# Patient Record
Sex: MALE | Race: OTHER | NOT HISPANIC OR LATINO | URBAN - METROPOLITAN AREA
[De-identification: names, ages, dates, MRNs, and addresses within clinical notes are randomized per-mention and may not be internally consistent; named-entity substitution may affect disease eponyms.]

---

## 2017-08-03 ENCOUNTER — EMERGENCY (EMERGENCY)
Facility: HOSPITAL | Age: 62
LOS: 1 days | Discharge: LEFT BEFORE DISCHARGE | End: 2017-08-03
Attending: EMERGENCY MEDICINE
Payer: COMMERCIAL

## 2017-08-03 VITALS
DIASTOLIC BLOOD PRESSURE: 77 MMHG | RESPIRATION RATE: 20 BRPM | HEIGHT: 67 IN | HEART RATE: 88 BPM | TEMPERATURE: 100 F | WEIGHT: 154.98 LBS | SYSTOLIC BLOOD PRESSURE: 117 MMHG | OXYGEN SATURATION: 96 %

## 2017-08-03 VITALS
TEMPERATURE: 99 F | DIASTOLIC BLOOD PRESSURE: 77 MMHG | OXYGEN SATURATION: 99 % | SYSTOLIC BLOOD PRESSURE: 134 MMHG | RESPIRATION RATE: 18 BRPM | HEART RATE: 76 BPM

## 2017-08-03 DIAGNOSIS — R10.9 UNSPECIFIED ABDOMINAL PAIN: ICD-10-CM

## 2017-08-03 DIAGNOSIS — Z88.2 ALLERGY STATUS TO SULFONAMIDES: ICD-10-CM

## 2017-08-03 DIAGNOSIS — Z85.46 PERSONAL HISTORY OF MALIGNANT NEOPLASM OF PROSTATE: ICD-10-CM

## 2017-08-03 LAB
ALBUMIN SERPL ELPH-MCNC: 3.8 G/DL — SIGNIFICANT CHANGE UP (ref 3.5–5)
ALP SERPL-CCNC: 85 U/L — SIGNIFICANT CHANGE UP (ref 40–120)
ALT FLD-CCNC: 28 U/L DA — SIGNIFICANT CHANGE UP (ref 10–60)
ANION GAP SERPL CALC-SCNC: 4 MMOL/L — LOW (ref 5–17)
APTT BLD: 30 SEC — SIGNIFICANT CHANGE UP (ref 27.5–37.4)
AST SERPL-CCNC: 19 U/L — SIGNIFICANT CHANGE UP (ref 10–40)
BASOPHILS # BLD AUTO: 0.1 K/UL — SIGNIFICANT CHANGE UP (ref 0–0.2)
BASOPHILS NFR BLD AUTO: 0.6 % — SIGNIFICANT CHANGE UP (ref 0–2)
BILIRUB SERPL-MCNC: 0.7 MG/DL — SIGNIFICANT CHANGE UP (ref 0.2–1.2)
BUN SERPL-MCNC: 13 MG/DL — SIGNIFICANT CHANGE UP (ref 7–18)
CALCIUM SERPL-MCNC: 9 MG/DL — SIGNIFICANT CHANGE UP (ref 8.4–10.5)
CHLORIDE SERPL-SCNC: 102 MMOL/L — SIGNIFICANT CHANGE UP (ref 96–108)
CO2 SERPL-SCNC: 32 MMOL/L — HIGH (ref 22–31)
CREAT SERPL-MCNC: 1.11 MG/DL — SIGNIFICANT CHANGE UP (ref 0.5–1.3)
EOSINOPHIL # BLD AUTO: 0 K/UL — SIGNIFICANT CHANGE UP (ref 0–0.5)
EOSINOPHIL NFR BLD AUTO: 0.4 % — SIGNIFICANT CHANGE UP (ref 0–6)
GLUCOSE SERPL-MCNC: 98 MG/DL — SIGNIFICANT CHANGE UP (ref 70–99)
HCT VFR BLD CALC: 45.3 % — SIGNIFICANT CHANGE UP (ref 39–50)
HGB BLD-MCNC: 14.8 G/DL — SIGNIFICANT CHANGE UP (ref 13–17)
INR BLD: 1.1 RATIO — SIGNIFICANT CHANGE UP (ref 0.88–1.16)
LYMPHOCYTES # BLD AUTO: 1.6 K/UL — SIGNIFICANT CHANGE UP (ref 1–3.3)
LYMPHOCYTES # BLD AUTO: 13.9 % — SIGNIFICANT CHANGE UP (ref 13–44)
MCHC RBC-ENTMCNC: 27.5 PG — SIGNIFICANT CHANGE UP (ref 27–34)
MCHC RBC-ENTMCNC: 32.7 GM/DL — SIGNIFICANT CHANGE UP (ref 32–36)
MCV RBC AUTO: 84.2 FL — SIGNIFICANT CHANGE UP (ref 80–100)
MONOCYTES # BLD AUTO: 1 K/UL — HIGH (ref 0–0.9)
MONOCYTES NFR BLD AUTO: 8.5 % — SIGNIFICANT CHANGE UP (ref 2–14)
NEUTROPHILS # BLD AUTO: 8.9 K/UL — HIGH (ref 1.8–7.4)
NEUTROPHILS NFR BLD AUTO: 76.7 % — SIGNIFICANT CHANGE UP (ref 43–77)
PLATELET # BLD AUTO: 183 K/UL — SIGNIFICANT CHANGE UP (ref 150–400)
POTASSIUM SERPL-MCNC: 4.3 MMOL/L — SIGNIFICANT CHANGE UP (ref 3.5–5.3)
POTASSIUM SERPL-SCNC: 4.3 MMOL/L — SIGNIFICANT CHANGE UP (ref 3.5–5.3)
PROT SERPL-MCNC: 7.7 G/DL — SIGNIFICANT CHANGE UP (ref 6–8.3)
PROTHROM AB SERPL-ACNC: 12 SEC — SIGNIFICANT CHANGE UP (ref 9.8–12.7)
RBC # BLD: 5.38 M/UL — SIGNIFICANT CHANGE UP (ref 4.2–5.8)
RBC # FLD: 12.1 % — SIGNIFICANT CHANGE UP (ref 10.3–14.5)
SODIUM SERPL-SCNC: 138 MMOL/L — SIGNIFICANT CHANGE UP (ref 135–145)
WBC # BLD: 11.6 K/UL — HIGH (ref 3.8–10.5)
WBC # FLD AUTO: 11.6 K/UL — HIGH (ref 3.8–10.5)

## 2017-08-03 PROCEDURE — 85730 THROMBOPLASTIN TIME PARTIAL: CPT

## 2017-08-03 PROCEDURE — 96374 THER/PROPH/DIAG INJ IV PUSH: CPT | Mod: XU

## 2017-08-03 PROCEDURE — 85027 COMPLETE CBC AUTOMATED: CPT

## 2017-08-03 PROCEDURE — 99284 EMERGENCY DEPT VISIT MOD MDM: CPT | Mod: 25

## 2017-08-03 PROCEDURE — 99284 EMERGENCY DEPT VISIT MOD MDM: CPT

## 2017-08-03 PROCEDURE — 80053 COMPREHEN METABOLIC PANEL: CPT

## 2017-08-03 PROCEDURE — 96375 TX/PRO/DX INJ NEW DRUG ADDON: CPT

## 2017-08-03 PROCEDURE — 74177 CT ABD & PELVIS W/CONTRAST: CPT | Mod: 26

## 2017-08-03 PROCEDURE — 85610 PROTHROMBIN TIME: CPT

## 2017-08-03 PROCEDURE — 74177 CT ABD & PELVIS W/CONTRAST: CPT

## 2017-08-03 RX ORDER — ACETAMINOPHEN 500 MG
975 TABLET ORAL ONCE
Qty: 0 | Refills: 0 | Status: COMPLETED | OUTPATIENT
Start: 2017-08-03 | End: 2017-08-03

## 2017-08-03 RX ORDER — SODIUM CHLORIDE 9 MG/ML
3 INJECTION INTRAMUSCULAR; INTRAVENOUS; SUBCUTANEOUS ONCE
Qty: 0 | Refills: 0 | Status: COMPLETED | OUTPATIENT
Start: 2017-08-03 | End: 2017-08-03

## 2017-08-03 RX ORDER — PIPERACILLIN AND TAZOBACTAM 4; .5 G/20ML; G/20ML
3.38 INJECTION, POWDER, LYOPHILIZED, FOR SOLUTION INTRAVENOUS ONCE
Qty: 0 | Refills: 0 | Status: COMPLETED | OUTPATIENT
Start: 2017-08-03 | End: 2017-08-03

## 2017-08-03 RX ORDER — METRONIDAZOLE 500 MG
1 TABLET ORAL
Qty: 40 | Refills: 0
Start: 2017-08-03 | End: 2017-08-13

## 2017-08-03 RX ORDER — CIPROFLOXACIN LACTATE 400MG/40ML
1 VIAL (ML) INTRAVENOUS
Qty: 20 | Refills: 0
Start: 2017-08-03 | End: 2017-08-13

## 2017-08-03 RX ORDER — ONDANSETRON 8 MG/1
4 TABLET, FILM COATED ORAL ONCE
Qty: 0 | Refills: 0 | Status: COMPLETED | OUTPATIENT
Start: 2017-08-03 | End: 2017-08-03

## 2017-08-03 RX ORDER — SODIUM CHLORIDE 9 MG/ML
1000 INJECTION INTRAMUSCULAR; INTRAVENOUS; SUBCUTANEOUS ONCE
Qty: 0 | Refills: 0 | Status: COMPLETED | OUTPATIENT
Start: 2017-08-03 | End: 2017-08-03

## 2017-08-03 RX ADMIN — SODIUM CHLORIDE 3 MILLILITER(S): 9 INJECTION INTRAMUSCULAR; INTRAVENOUS; SUBCUTANEOUS at 17:12

## 2017-08-03 RX ADMIN — SODIUM CHLORIDE 1000 MILLILITER(S): 9 INJECTION INTRAMUSCULAR; INTRAVENOUS; SUBCUTANEOUS at 17:45

## 2017-08-03 RX ADMIN — Medication 975 MILLIGRAM(S): at 19:22

## 2017-08-03 RX ADMIN — ONDANSETRON 4 MILLIGRAM(S): 8 TABLET, FILM COATED ORAL at 17:25

## 2017-08-03 RX ADMIN — PIPERACILLIN AND TAZOBACTAM 200 GRAM(S): 4; .5 INJECTION, POWDER, LYOPHILIZED, FOR SOLUTION INTRAVENOUS at 17:25

## 2017-08-03 NOTE — ED PROVIDER NOTE - OBJECTIVE STATEMENT
63 y/o M pt w/ no significant PMHx presents to the ED c/o abd pain, diarrhea and subjective fever x2 days. Pt states that he went to his PMD for his symptoms and was told to present to the ED. Pt states that he had a colonoscopy within the last 2 years which was normal. Pt denies nausea, vomiting, dysuria, hematuria, or any other complaints. Pt is allergic to sulfa drugs (hives).

## 2017-08-03 NOTE — ED ADULT NURSE NOTE - OBJECTIVE STATEMENT
Pt aox3, ambulatory, c/o LLQ abdominal pain, diarrhea x2 days. PT denies nausea, vomit, chills, or fever. Pt denies hematuria, denies pain in urination. Abdomen soft, LLQ tender to touch.

## 2017-08-03 NOTE — CONSULT NOTE ADULT - SUBJECTIVE AND OBJECTIVE BOX
HPI: 63 y/o male with h/o prostate ca treated with brachytherapy per pt; c/o LLQ pain for several days; persisted even after taking mag citrate for constipation; denies blood in stool; +fever, no n/v  PCP sent pt to ED for eval  CT done in ED read as Findings suggest colitis of the sigmoid colon. Questionable small foci of   pneumatosis. No portal venous gas. No free air or evidence of abscess.   Colonoscopic correlation is recommended after acute disease resolution.        PAST MEDICAL & SURGICAL HISTORY:  No pertinent past medical history  No significant past surgical history      Vital Signs Last 24 Hrs  T(C): 37.4 (03 Aug 2017 16:29), Max: 37.8 (03 Aug 2017 13:25)  T(F): 99.3 (03 Aug 2017 16:29), Max: 100 (03 Aug 2017 13:25)  HR: 76 (03 Aug 2017 16:29) (76 - 88)  BP: 134/77 (03 Aug 2017 16:29) (117/77 - 134/77)  BP(mean): --  RR: 18 (03 Aug 2017 16:29) (18 - 20)  SpO2: 99% (03 Aug 2017 16:29) (96% - 99%)                          14.8   11.6  )-----------( 183      ( 03 Aug 2017 16:01 )             45.3     08-03    138  |  102  |  13  ----------------------------<  98  4.3   |  32<H>  |  1.11    Ca    9.0      03 Aug 2017 16:01    TPro  7.7  /  Alb  3.8  /  TBili  0.7  /  DBili  x   /  AST  19  /  ALT  28  /  AlkPhos  85  08-03    PT/INR - ( 03 Aug 2017 16:01 )   PT: 12.0 sec;   INR: 1.10 ratio         PTT - ( 03 Aug 2017 16:01 )  PTT:30.0 sec    PHYSICAL EXAM  Abdomen: soft, LLQ pain, no rebound or guarding or peritoneal signs    ASSESSMENT/ PLAN:  63 y/o male with CT findings as per HPI  admission recommended for iv abx, serial abd exams  Pt refused admission; signed out AMA

## 2017-08-03 NOTE — ED PROVIDER NOTE - PROGRESS NOTE DETAILS
Patient is aaox3 and is asking to leave. The patient does not want to be admitted and understands the risk of leaving including permanent disability and DEATH. Risk, benefit, hazards, alternatives and precautions were reviewed and the pt voices understanding. He is aware that she may return at anytime and that close follow up with primary care doctor is recommended as soon as possible. He voices understanding. Will sign AMA

## 2017-08-03 NOTE — ED ADULT NURSE NOTE - ED STAT RN HANDOFF DETAILS
Endorsed to RN Joaquin, alert, responsive, resting in stretcher. Moving all extremities. Awaiting disposition.

## 2017-08-03 NOTE — ED ADULT NURSE NOTE - CHPI ED SYMPTOMS NEG
no nausea/no burning urination/no chills/no fever/no dysuria/no vomiting/no hematuria/no blood in stool

## 2019-08-14 NOTE — ED ADULT NURSE NOTE - PAIN: PRESENCE, MLM
Patients dtr Lydia Gatica called back-she is able to do 9/19/19 appt for Dr. Vale and device clinic in the afternoon after 12-please call back an schedule    [phone]   complains of pain/discomfort

## 2020-06-03 ENCOUNTER — TRANSCRIPTION ENCOUNTER (OUTPATIENT)
Age: 65
End: 2020-06-03

## 2020-06-29 NOTE — ED ADULT NURSE NOTE - ED STAT RN HANDOFF WHERE
Hide Include Location In Plan Question?: No Detail Level: Zone Include Location In Plan?: Yes Detail Level: Generalized Detail Level: Simple Detail Level: Detailed B1

## 2021-02-22 ENCOUNTER — TRANSCRIPTION ENCOUNTER (OUTPATIENT)
Age: 66
End: 2021-02-22

## 2021-12-06 ENCOUNTER — TRANSCRIPTION ENCOUNTER (OUTPATIENT)
Age: 66
End: 2021-12-06

## 2022-09-13 ENCOUNTER — NON-APPOINTMENT (OUTPATIENT)
Age: 67
End: 2022-09-13

## 2022-10-19 ENCOUNTER — NON-APPOINTMENT (OUTPATIENT)
Age: 67
End: 2022-10-19

## 2023-04-28 ENCOUNTER — NON-APPOINTMENT (OUTPATIENT)
Age: 68
End: 2023-04-28

## 2023-05-21 ENCOUNTER — EMERGENCY (EMERGENCY)
Facility: HOSPITAL | Age: 68
LOS: 1 days | Discharge: HOME | End: 2023-05-21
Attending: EMERGENCY MEDICINE
Payer: MEDICARE

## 2023-05-21 VITALS
HEIGHT: 67 IN | RESPIRATION RATE: 17 BRPM | WEIGHT: 154.98 LBS | SYSTOLIC BLOOD PRESSURE: 130 MMHG | TEMPERATURE: 98 F | DIASTOLIC BLOOD PRESSURE: 79 MMHG | OXYGEN SATURATION: 97 % | HEART RATE: 66 BPM

## 2023-05-21 PROCEDURE — 72131 CT LUMBAR SPINE W/O DYE: CPT | Mod: 26,MA

## 2023-05-21 PROCEDURE — 99284 EMERGENCY DEPT VISIT MOD MDM: CPT | Mod: 25

## 2023-05-21 PROCEDURE — 99284 EMERGENCY DEPT VISIT MOD MDM: CPT

## 2023-05-21 PROCEDURE — 72131 CT LUMBAR SPINE W/O DYE: CPT | Mod: MA

## 2023-05-21 RX ORDER — OXYCODONE AND ACETAMINOPHEN 5; 325 MG/1; MG/1
1 TABLET ORAL ONCE
Refills: 0 | Status: DISCONTINUED | OUTPATIENT
Start: 2023-05-21 | End: 2023-05-21

## 2023-05-21 NOTE — ED ADULT NURSE NOTE - OBJECTIVE STATEMENT
Patient c/o worsening lower back pain for 3 weeks, right lower leg pain for 2 weeks, denies any injury. Patient has recent imaging done per report.

## 2023-05-21 NOTE — ED PROVIDER NOTE - CLINICAL SUMMARY MEDICAL DECISION MAKING FREE TEXT BOX
Advised patient to stick with his doctors. Gave pain medication; patient refused percocet. Will get CT lumbar spine.

## 2023-05-21 NOTE — ED PROVIDER NOTE - NSFOLLOWUPINSTRUCTIONS_ED_ALL_ED_FT
English    Acute Back Pain, Adult  Acute back pain is sudden and usually short-lived. It is often caused by an injury to the muscles and tissues in the back. The injury may result from:  A muscle, tendon, or ligament getting overstretched or torn. Ligaments are tissues that connect bones to each other. Lifting something improperly can cause a back strain.  Wear and tear (degeneration) of the spinal disks. Spinal disks are circular tissue that provide cushioning between the bones of the spine (vertebrae).  Twisting motions, such as while playing sports or doing yard work.  A hit to the back.  Arthritis.  You may have a physical exam, lab tests, and imaging tests to find the cause of your pain. Acute back pain usually goes away with rest and home care.    Follow these instructions at home:  Managing pain, stiffness, and swelling    Take over-the-counter and prescription medicines only as told by your health care provider. Treatment may include medicines for pain and inflammation that are taken by mouth or applied to the skin, or muscle relaxants.  Your health care provider may recommend applying ice during the first 24–48 hours after your pain starts. To do this:  Put ice in a plastic bag.  Place a towel between your skin and the bag.  Leave the ice on for 20 minutes, 2–3 times a day.  Remove the ice if your skin turns bright red. This is very important. If you cannot feel pain, heat, or cold, you have a greater risk of damage to the area.  If directed, apply heat to the affected area as often as told by your health care provider. Use the heat source that your health care provider recommends, such as a moist heat pack or a heating pad.  Place a towel between your skin and the heat source.  Leave the heat on for 20–30 minutes.  Remove the heat if your skin turns bright red. This is especially important if you are unable to feel pain, heat, or cold. You have a greater risk of getting burned.  Activity    Comparisons of good and bad posture while driving, standing, sitting at a desk, and lifting heavy objects.  Do not stay in bed. Staying in bed for more than 1–2 days can delay your recovery.  Sit up and stand up straight. Avoid leaning forward when you sit or hunching over when you stand.  If you work at a desk, sit close to it so you do not need to lean over. Keep your chin tucked in. Keep your neck drawn back, and keep your elbows bent at a 90-degree angle (right angle).  Sit high and close to the steering wheel when you drive. Add lower back (lumbar) support to your car seat, if needed.  Take short walks on even surfaces as soon as you are able. Try to increase the length of time you walk each day.  Do not sit, drive, or  one place for more than 30 minutes at a time. Sitting or standing for long periods of time can put stress on your back.  Do not drive or use heavy machinery while taking prescription pain medicine.  Use proper lifting techniques. When you bend and lift, use positions that put less stress on your back:  Bend your knees.  Keep the load close to your body.  Avoid twisting.  Exercise regularly as told by your health care provider. Exercising helps your back heal faster and helps prevent back injuries by keeping muscles strong and flexible.  Work with a physical therapist to make a safe exercise program, as recommended by your health care provider. Do any exercises as told by your physical therapist.  Lifestyle    Maintain a healthy weight. Extra weight puts stress on your back and makes it difficult to have good posture.  Avoid activities or situations that make you feel anxious or stressed. Stress and anxiety increase muscle tension and can make back pain worse. Learn ways to manage anxiety and stress, such as through exercise.  General instructions    Sleep on a firm mattress in a comfortable position. Try lying on your side with your knees slightly bent. If you lie on your back, put a pillow under your knees.  Keep your head and neck in a straight line with your spine (neutral position) when using electronic equipment like smartphones or pads. To do this:  Raise your smartphone or pad to look at it instead of bending your head or neck to look down.  Put the smartphone or pad at the level of your face while looking at the screen.  Follow your treatment plan as told by your health care provider. This may include:  Cognitive or behavioral therapy.  Acupuncture or massage therapy.  Meditation or yoga.  Contact a health care provider if:  You have pain that is not relieved with rest or medicine.  You have increasing pain going down into your legs or buttocks.  Your pain does not improve after 2 weeks.  You have pain at night.  You lose weight without trying.  You have a fever or chills.  You develop nausea or vomiting.  You develop abdominal pain.  Get help right away if:  You develop new bowel or bladder control problems.  You have unusual weakness or numbness in your arms or legs.  You feel faint.  These symptoms may represent a serious problem that is an emergency. Do not wait to see if the symptoms will go away. Get medical help right away. Call your local emergency services (911 in the [place] Do not drive yourself to the hospital.    Summary  Acute back pain is sudden and usually short-lived.  Use proper lifting techniques. When you bend and lift, use positions that put less stress on your back.  Take over-the-counter and prescription medicines only as told by your health care provider, and apply heat or ice as told.  This information is not intended to replace advice given to you by your health care provider. Make sure you discuss any questions you have with your health care provider.    Document Revised: 03/11/2022 Document Reviewed: 03/11/2022  Elsevier Patient Education © 2023 [employer]

## 2023-05-21 NOTE — ED ADULT NURSE NOTE - NSFALLUNIVINTERV_ED_ALL_ED
Bed/Stretcher in lowest position, wheels locked, appropriate side rails in place/Call bell, personal items and telephone in reach/Instruct patient to call for assistance before getting out of bed/chair/stretcher/Non-slip footwear applied when patient is off stretcher/[place] to call system/Physically safe environment - no spills, clutter or unnecessary equipment/Purposeful proactive rounding/Room/bathroom lighting operational, light cord in reach

## 2023-05-21 NOTE — ED PROVIDER NOTE - PATIENT PORTAL LINK FT
You can access the FollowMyHealth Patient Portal offered by [facility] by registering at the following website: [email or web address]. By joining [facility]’s FollowMyHealth portal, you will also be able to view your health information using other applications (apps) compatible with our system.

## 2023-05-21 NOTE — ED PROVIDER NOTE - NS_ATTENDINGSCRIBE_ED_ALL_ED
Labile I personally performed the service described in the documentation recorded by the scribe in my presence, and it accurately and completely records my words and actions.

## 2023-05-21 NOTE — ED PROVIDER NOTE - OBJECTIVE STATEMENT
68 year old male with arthritis presents to ED complaining of lower back pain for 3 weeks and leg pain. In particular he complains of right lower leg pain radiating downwards for 2 weeks, stating that the pain is shooting in nature and severe (8/10) especially at night. Patient reports seeing multiple doctors including a podiatrist who recommended vascular testing. Per patient orthopedist at [facility] also did X-Rays which showed knee arthritis and ?hip arthritis. Patient also states he was diagnosed with cervical pinched nerve c5-c6 level. Endorses currently on ibuprofen.  Allergies: sulfa drugs (hives)

## 2023-05-21 NOTE — ED PROVIDER NOTE - MUSCULOSKELETAL, MLM
Walks with stiff gait. Mild tenderness over lower back. Knee mildly tender diffusely, no effusion. Right leg: no swelling noted, no signs of DVT. Right foot: posterior tibia and distal pulses present, no discoloration, no ecchymosis.

## 2023-05-21 NOTE — ED ADULT TRIAGE NOTE - PATIENT ON (OXYGEN DELIVERY METHOD)
Pharmacy faxed in a request for prior authorization on:    Medication: Vyvanse   Dosage: 20 mg  Quantity requested:  30  Pharmacy for prescription has been selected.    Initiation of prior authorization needed.   room air

## 2023-05-30 ENCOUNTER — APPOINTMENT (OUTPATIENT)
Dept: ORTHOPEDIC SURGERY | Facility: CLINIC | Age: 68
End: 2023-05-30
Payer: MEDICARE

## 2023-05-30 DIAGNOSIS — M19.042 PRIMARY OSTEOARTHRITIS, LEFT HAND: ICD-10-CM

## 2023-05-30 PROCEDURE — 99213 OFFICE O/P EST LOW 20 MIN: CPT

## 2023-05-30 PROCEDURE — 73140 X-RAY EXAM OF FINGER(S): CPT | Mod: LT

## 2023-05-30 NOTE — ASSESSMENT
[FreeTextEntry1] : The condition was explained to the patient.\par \par Discussed that arthritis is a progressive degenerative process, and symptoms may have a waxing/waning course, which may be exacerbated by activity or trauma. Discussed treatment options - activity modification, bracing, steroid injection, or last resort surgery.\par Discussed increased propensity for stiffness due to underlying arthritis.\par \par Discussed my concern that hand stiffness can cause grave dysfunction and is difficult to overcome once it has set in. \par Encouraged HEP for digital ROM.\par \par - recommend silicone sleeve for finger.\par - recommend activity modification, moist heat vs ice PRN.\par - recommend Voltaren gel PRN. reviewed contra-indicated medical conditions (eg liver disease, kidney disease, or GI ulcer/bleeding) or medications (eg blood thinners).\par \par F/u PRN.

## 2023-05-30 NOTE — HISTORY OF PRESENT ILLNESS
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 Reports that he has had intermittent pain for years, but it has never been this swollen.\par Denies injury.\par Using coban wrap.\par Using Tylenol, Advil PRN for his knee.\par \par Hx: eosinophilic esophagitis. [] : no [FreeTextEntry5] : QASIM 68 year old M here for LT index finger, onset pain for about 2 weeks, pt noticed the site became sensitive and swelled up , pt believed this may have stemmed from an old injury \par pt took two extra strength Tylenol and 3 Advil, mainly for his knee  [FreeTextEntry6] : sensitive  [FreeTextEntry9] : bandage wrap [de-identified] : touch

## 2023-05-30 NOTE — IMAGING
[de-identified] : LEFT HAND\par skin intact. mild to moderate swelling of IF DIPJ.\par TTP to IF DIPJ.\par IF: DIPJ mild ext lag, otherwise good ext. flex 5cm to DPC.\par good flex/ext other digits. \par SILT to median, ulnar, radial distribution. \par brisk cap refill all digits.\par no triggering.\par \par \par XRAYS OF LEFT INDEX FINGER: no acute displaced fracture or dislocation. severe djd of DIPJ.

## 2023-06-03 ENCOUNTER — EMERGENCY (EMERGENCY)
Facility: HOSPITAL | Age: 68
LOS: 1 days | Discharge: HOME | End: 2023-06-03
Attending: EMERGENCY MEDICINE
Payer: MEDICARE

## 2023-06-03 VITALS
WEIGHT: 160.06 LBS | HEART RATE: 65 BPM | DIASTOLIC BLOOD PRESSURE: 80 MMHG | TEMPERATURE: 99 F | RESPIRATION RATE: 18 BRPM | HEIGHT: 67 IN | SYSTOLIC BLOOD PRESSURE: 150 MMHG

## 2023-06-03 PROCEDURE — 99284 EMERGENCY DEPT VISIT MOD MDM: CPT

## 2023-06-03 PROCEDURE — 73562 X-RAY EXAM OF KNEE 3: CPT | Mod: 26,RT

## 2023-06-03 PROCEDURE — 73562 X-RAY EXAM OF KNEE 3: CPT

## 2023-06-03 PROCEDURE — 96372 THER/PROPH/DIAG INJ SC/IM: CPT

## 2023-06-03 PROCEDURE — 99284 EMERGENCY DEPT VISIT MOD MDM: CPT | Mod: 25

## 2023-06-03 RX ORDER — DIAZEPAM 5 MG
5 TABLET ORAL ONCE
Refills: 0 | Status: DISCONTINUED | OUTPATIENT
Start: 2023-06-03 | End: 2023-06-03

## 2023-06-03 RX ORDER — MORPHINE SULFATE 50 MG/1
4 CAPSULE, EXTENDED RELEASE ORAL ONCE
Refills: 0 | Status: DISCONTINUED | OUTPATIENT
Start: 2023-06-03 | End: 2023-06-03

## 2023-06-03 RX ORDER — DIAZEPAM 5 MG
1 TABLET ORAL
Qty: 12 | Refills: 0
Start: 2023-06-03 | End: 2023-06-06

## 2023-06-03 RX ORDER — IBUPROFEN 200 MG
1 TABLET ORAL
Qty: 15 | Refills: 0
Start: 2023-06-03 | End: 2023-06-07

## 2023-06-03 RX ORDER — DIAZEPAM 5 MG
1 TABLET ORAL
Qty: 8 | Refills: 0
Start: 2023-06-03 | End: 2023-06-06

## 2023-06-03 RX ADMIN — Medication 5 MILLIGRAM(S): at 15:27

## 2023-06-03 RX ADMIN — MORPHINE SULFATE 4 MILLIGRAM(S): 50 CAPSULE, EXTENDED RELEASE ORAL at 15:12

## 2023-06-03 NOTE — ED PROVIDER NOTE - CLINICAL SUMMARY MEDICAL DECISION MAKING FREE TEXT BOX
68 year old male with no significant past medical history presents to the ED with complaints of pain and swelling of the right foot and posterior right knee. Will obtain x-ray, and provide Morphine and Valium and reassess.

## 2023-06-03 NOTE — ED ADULT NURSE NOTE - OBJECTIVE STATEMENT
pt is a 67 y/o  male  with  c/o pt is a 67 y/o male c/o pain and swelling on the right foot and right knee that started 1 month ago.

## 2023-06-03 NOTE — ED ADULT NURSE NOTE - NSFALLUNIVINTERV_ED_ALL_ED
Bed/Stretcher in lowest position, wheels locked, appropriate side rails in place/Call bell, personal items and telephone in reach/Instruct patient to call for assistance before getting out of bed/chair/stretcher/Non-slip footwear applied when patient is off stretcher/[place] to call system/Physically safe environment - no spills, clutter or unnecessary equipment/Purposeful proactive rounding/Room/bathroom lighting operational, light cord in reach

## 2023-06-03 NOTE — ED PROVIDER NOTE - PHYSICAL EXAMINATION
Normal distal pedal and femoral pulses. No effusion. Diffuse tenderness in popliteal area. Unable to sit still secondary to pain.

## 2023-06-03 NOTE — ED ADULT NURSE NOTE - NS ED NURSE LEVEL OF CONSCIOUSNESS MENTAL STATUS
Physical Therapy  Visit Type: initial evaluation  Precautions:  Medical precautions: ; standard precautions.   Lines:       Lines in chart and on patient reviewed, precautions maintained throughout session.    SUBJECTIVE  Patient agreed to participate in therapy this date.  Pt agreeable to PT treatment.  Patient / Family Goal: maximize function and return home     OBJECTIVE   Level of consciousness: alert    Oriented to person, place, time and situation     Arousal alertness: appropriate responses to stimuli    Affect/Behavior: alert, pleasant and cooperative  Patient activity tolerance: 1 to 1 activity to rest    Bed Mobility:    Rolling left: modified independent    Rolling right: modified independent      Supine to sit: modified independent    Sit to supine: modified independent  Training completed:    Tasks: all aspects of bed mobility    Education details: body mechanics, patient safety and patient demonstrates understanding  Transfers:    Assistive devices: 4-wheeled walker    Sit to stand: modified independent    Stand to sit: modified independent    Stand pivot: modified independent  Training completed:    Tasks: sit to stand, stand to sit and stand pivot    Education details: body mechanics, patient safety and patient demonstrates understanding  Gait/Ambulation:     Assistance: modified independent   Assistive device: 4-wheeled walker    Distance (ft): 50; 50    Type: decreased jaydon  Training Completed:    Tasks: gait training on level surfaces, door management and assistive device use    Education details: body mechanics, patient safety and patient demonstrates understanding      Interventions     Training provided: activity tolerance, balance retraining, bed mobility training, behavioral modification, body mechanics, caregiver training, functional ambulation, energy conservation, compensatory techniques, gait training, positioning, transfer training and safety training    Skilled input: Verbal  instruction/cues, tactile instruction/cues, posture correction and facilitation  Verbal Consent: Writer verbally educated and received verbal consent for hand placement, positioning of patient, and techniques to be performed today from patient for clothing adjustments for techniques, hand placement and palpation for techniques and therapist position for techniques as described above and how they are pertinent to the patient's plan of care.       ASSESSMENT   Impairments: activity tolerance and endurance    Patient seen in room 611/01 at [facility]TH Floor Inpatient Unit [facility] and presents at baseline which was the Modified Independent level for overall mobility with 4ww . Currently lives Alone in an  (independent living) .     For safe return to this environment, the patient needs to be Modified Independent level for overall mobility with 4ww. Pt came to the ED due to Rectal Bleeding and was subsequently admitted for Acute lower GI bleeding.    Physical Therapy evaluation/discharge session today focused on bed mobility, transfers/ambulation with the 4ww, and coordination of care with RN staff. Pt moving at the independent level for overall mobility with the 4ww. Recommended ind with 4ww for mobility on the unit - updated white board in room and notified RN. Anticipate pt will be able to return to their home environment when medically cleared. Overall pt feels that they are presenting at their baseline level of function and does not express any concerns for returning home once they are medically stable. PT to sign off at this time. Please re-order PT for re-evaluation if pt presents with a change in functional status during continued admission or undergoes any invasive procedures/surgeries. Otherwise, anticipate safe d/c home. Pt has close family support. Pt is agreeable to this plan. DC PT 9/16/2022.             Discharge Recommendations  Recommendation for Discharge Location: PT WI: Home with Home therapy              PT/OT Mobility Equipment for Discharge: has 4ww      PT Identified Barriers to Discharge: weak     Progress: improving as expected     • Skilled therapy is not required due to DC PT 9/16/2022.     • Predicted patient presentation: Low (stable) - Patient comorbidities and complexities, as defined above, will have little effect on progress for prescribed plan of care.    Education Provided:   Learning Assessment:  - Primary learner: patient  - Are they ready to learn: yes  - Preferred learning style: verbal and demonstration  - Barriers to learning: no barriers apparent at this time  Education provided during session:  - Receiving Education: patient  - Results of above outlined education: Needs reinforcement    Patient at End of Session:   Location: in bed  Safety measures: call light within reach and bed rails x2  Handoff to: nurse    PLAN   Suggestions for next session as indicated: DC PT 9/16/2022          Frequency Comments: DC PT 9/15/22  Interventions: balance, body mechanics, compensatory technique education, energy conservation, neuromuscular re-education, ROM, strengthening, bed mobility, HEP train/position, safety education, patient/family training, community reintegration, endurance training, functional transfer training, modalities and gait training  Agreement to plan and goals: patient agrees with goals and treatment plan        GOALS  Review Date: 9/30/2022  Long Term Goals: (to be met by time of discharge from hospital)  Sit to supine: Patient will complete sit to supine modified independent.  Status: met   Supine to sit: Patient will complete supine to sit modified independent.  Status: met   Sit to stand: Patient will complete sit to stand transfer with 4-wheeled walker, modified independent.   Status: met   Stand to sit: Patient will complete stand to sit transfer with 4-wheeled walker, modified independent.   Status: met   Stand pivot: Patient will complete stand pivot transfer with  4-wheeled walker, modified independent.   Status: met   Ambulation (even): Patient will ambulate on even surface for 150 feet with 4-wheeled walker, modified independent.   Status: met     Documented in the chart in the following areas: Prior Level of Function. Assessment. Plan. Patient Education.      Therapy procedure time and total treatment time can be found documented on the Time Entry flowsheet   Awake/Alert/Cooperative

## 2023-06-03 NOTE — ED PROVIDER NOTE - PATIENT PORTAL LINK FT
You can access the FollowMyHealth Patient Portal offered by [facility] by registering at the following website: [email or web address]. By joining [facility]’s FollowMyHealth portal, you will also be able to view your health information using other applications (apps) compatible with our system.

## 2023-06-03 NOTE — ED ADULT TRIAGE NOTE - RESPIRATORY RATE (BREATHS/MIN)
18 Opioid Pregnancy And Lactation Text: These medications can lead to premature delivery and should be avoided during pregnancy. These medications are also present in breast milk in small amounts.

## 2023-06-03 NOTE — ED PROVIDER NOTE - OBJECTIVE STATEMENT
68 year old male with no significant past medical history presents to the ED with complaints of pain and swelling of the right foot and posterior right knee. The patient states that he has received foot x-rays and was seen by podiatry, and a knee injection of the site by Orthopedic and received vascular studies of the knee which were negative. The patient endorses that the knee pain prevents him from flexing the knee. The patient denies any other symptoms.   Allergies: Sulfa drugs- hives

## 2023-06-22 ENCOUNTER — APPOINTMENT (OUTPATIENT)
Dept: ORTHOPEDIC SURGERY | Facility: CLINIC | Age: 68
End: 2023-06-22
Payer: MEDICARE

## 2023-06-22 PROBLEM — G58.9 MONONEUROPATHY, UNSPECIFIED: Chronic | Status: ACTIVE | Noted: 2023-05-21

## 2023-06-22 PROCEDURE — 99214 OFFICE O/P EST MOD 30 MIN: CPT

## 2023-06-22 PROCEDURE — 72050 X-RAY EXAM NECK SPINE 4/5VWS: CPT

## 2023-06-22 RX ORDER — GABAPENTIN 100 MG/1
100 CAPSULE ORAL
Qty: 60 | Refills: 1 | Status: ACTIVE | COMMUNITY
Start: 2023-06-22 | End: 1900-01-01

## 2023-06-22 NOTE — IMAGING
[Facet arthropathy] : Facet arthropathy [Disc space narrowing] : Disc space narrowing [de-identified] : CSPINE\par Inspection: No rash or ecchymosis\par Palpation: spasm and TTP in traps, rhomboids, paracervicals\par ROM: Limited all planes\par Strength: 5/5 bilateral deltoid, biceps, triceps, wrist flexors, wrist extensors, , abductors\par Sensation: Sensation present to light touch bilateral C5-T1 distributions\par Reflexes: Negative Wills's bilaterally\par Able to tandem gait

## 2023-06-22 NOTE — HISTORY OF PRESENT ILLNESS
[7] : 7 [Bending forward] : bending forward [Extending back] : extending back [Lying in bed] : lying in bed [] : yes [de-identified] : 5/5/23- EMG BUE- R C4/5, L C5/6 radic\par \par 6/22/23- RHDM. 2 months atraumatic neck pain radiating in to L>R posterior shoulder. Has been in PT for 8 weeks. Treated by Neurologist with IBU and EMG. No bb dysfunction.  [de-identified] : 05/05/2023 [de-identified] :  [de-identified] : 06/21/2023

## 2023-06-22 NOTE — ASSESSMENT
[FreeTextEntry1] : MRI to eval burden of stenosis\par C/w PT\par Trial christine\par Gabapentin- Patient advised of sedating effects, instructed not to drive, operate machinery, or take with other sedating medications. Advised of need to taper on/off medication and risk of abruptly stopping gabapentin.

## 2023-06-27 ENCOUNTER — FORM ENCOUNTER (OUTPATIENT)
Age: 68
End: 2023-06-27

## 2023-06-28 ENCOUNTER — APPOINTMENT (OUTPATIENT)
Dept: MRI IMAGING | Facility: CLINIC | Age: 68
End: 2023-06-28
Payer: MEDICARE

## 2023-06-28 PROCEDURE — 72141 MRI NECK SPINE W/O DYE: CPT

## 2023-07-20 ENCOUNTER — APPOINTMENT (OUTPATIENT)
Dept: ORTHOPEDIC SURGERY | Facility: CLINIC | Age: 68
End: 2023-07-20
Payer: MEDICARE

## 2023-07-20 PROCEDURE — 99215 OFFICE O/P EST HI 40 MIN: CPT

## 2023-07-27 NOTE — ASSESSMENT
[FreeTextEntry1] : Stenosis most notable C4-C7. Does have some L NF narrowing C3/4, C7/T1. Expect the majority of his symptoms would be addressed by C4-C7. Discussed that he does have the above imaging findings and that the majority of his symptoms should be addressed by C4-C7. \par \par Has failed a trial of conservative measures including PT, medications. Is interested in more definitive interventions. \par \par ACDF C4-C7 with bone stim\par Anterior Cervical Discectomy and Fusion- We've discussed the surgery details including instrumentation and grafting options (local, allograft, ICBG, and biologics) as well as potential for complications including but not limited to pain, scar, bleeding, and infection. There is also a possibility for hardware complication such as malposition of hardware, hardware loosening, pullout, failure or fracture of bone, adjacent segment disease, pseudarthrosis, and need for future surgery. Finally, we discussed potential for injury to nerves, the spinal cord either transient or permanent, CSF leak, damage to blood vessels, paralysis, blindness, stroke, dysphagia, dysphonia, need for transfusion, and medical complications.  The patient verbalized understanding and all questions were answered. \par \par Gabapentin- Patient advised of sedating effects, instructed not to drive, operate machinery, or take with other sedating medications. Advised of need to taper on/off medication and risk of abruptly stopping gabapentin.

## 2023-07-27 NOTE — IMAGING
[Facet arthropathy] : Facet arthropathy [Disc space narrowing] : Disc space narrowing [de-identified] : CSPINE\par Inspection: No rash or ecchymosis\par Palpation: spasm and TTP in traps, rhomboids, paracervicals\par ROM: Limited all planes\par Strength: 5/5 bilateral deltoid, biceps, triceps, wrist flexors, wrist extensors, , abductors\par Sensation: Sensation present to light touch bilateral C5-T1 distributions\par Reflexes: Negative Wills's bilaterally\par Able to tandem gait\par \par Bilateral Shoulders-\par Palpation: No tenderness to palpation\par ROM: Full with no pain\par Strength: Decent strength with rotator cuff testing\par Provocative maneuvers: Negative impingement testing

## 2023-07-27 NOTE — HISTORY OF PRESENT ILLNESS
[7] : 7 [Bending forward] : bending forward [Extending back] : extending back [Lying in bed] : lying in bed [] : yes [de-identified] : 5/5/23- EMG BUE- R C4/5, L C5/6 radic\par \par 6/28/23 Cervical MRI  - report noted in chart. \par Findings: Convex left curvature. Straightening of lordosis. Diffuse loss of disc signal and height. Diffuse \par anterior spurring and bulging. No compression fracture. Modic type 1 endplate changes at C3-C4 and C6-C7.\par C2-C3: No herniation or central stenosis. Luschka hypertrophy and facet arthrosis with left foraminal stenosis.\par C3-C4: Bulge and spondylotic ridge with central herniation and no central stenosis. Luschka hypertrophy and \par facet arthrosis with left foraminal stenosis.\par C4-C5: Bulge, spondylotic ridge and broad herniation impressing on the thecal sac with no contact of the cord \par or central herniation. Luschka hypertrophy and facet arthrosis with foraminal stenosis left greater than right.\par C5-C6: Bulge and spondylotic ridge impressing on the thecal sac with no herniation or central stenosis. Luschka \par hypertrophy and facet arthrosis with foraminal stenosis.\par C6-C7: Bulge with central and asymmetric to right herniation impressing on the thecal sac with central and \par right-sided canal stenosis. Luschka hypertrophy and facet arthrosis with foraminal stenosis right greater than \par left.\par C7-T1: Bulge with no herniation or central stenosis. Luschka hypertrophy and facet arthrosis with left foraminal \par stenosis.\par Ind. review- \par Stenosis most notable C4-C7\par =============================\par PMH: h/o prostate CA s/p brachytherapy\par PSH: knee scopes\par SH: no tob. occ. etoh, no drugs\par Occ: cameraman\par \par 6/22/23- RHDM. 2 months atraumatic neck pain radiating in to L>R posterior shoulder. Has been in PT for 8 weeks. Treated by Neurologist with IBU and EMG. No bb dysfunction. \par 7/20/23- MRI f/u. Finished 10 weeks of PT. Still radiating neck through the posterior scapulae to the UE Left>Right. Has been unable to exercise, has altered his lifestyle. Has had aching pain in the R scapula toward the RUE intermittently throughout the past several years.  [FreeTextEntry5] : QASIM 68 year old M here for MRI review of the C-spine  [de-identified] : 05/05/2023 [de-identified] :  [de-identified] : 06/21/2023

## 2023-10-05 ENCOUNTER — APPOINTMENT (OUTPATIENT)
Age: 68
End: 2023-10-05

## 2023-10-09 ENCOUNTER — APPOINTMENT (OUTPATIENT)
Dept: ORTHOPEDIC SURGERY | Facility: CLINIC | Age: 68
End: 2023-10-09
Payer: MEDICARE

## 2023-10-09 VITALS — BODY MASS INDEX: 24.33 KG/M2 | HEIGHT: 67 IN | WEIGHT: 155 LBS

## 2023-10-09 PROCEDURE — 73030 X-RAY EXAM OF SHOULDER: CPT | Mod: LT

## 2023-10-09 PROCEDURE — 20600 DRAIN/INJ JOINT/BURSA W/O US: CPT | Mod: LT

## 2023-10-09 PROCEDURE — 73010 X-RAY EXAM OF SHOULDER BLADE: CPT | Mod: LT

## 2023-10-09 PROCEDURE — 99203 OFFICE O/P NEW LOW 30 MIN: CPT | Mod: 25

## 2023-11-13 ENCOUNTER — APPOINTMENT (OUTPATIENT)
Dept: ORTHOPEDIC SURGERY | Facility: CLINIC | Age: 68
End: 2023-11-13
Payer: MEDICARE

## 2023-11-13 PROCEDURE — 99213 OFFICE O/P EST LOW 20 MIN: CPT

## 2023-11-15 ENCOUNTER — APPOINTMENT (OUTPATIENT)
Dept: MRI IMAGING | Facility: CLINIC | Age: 68
End: 2023-11-15
Payer: MEDICARE

## 2023-11-15 PROCEDURE — 73221 MRI JOINT UPR EXTREM W/O DYE: CPT | Mod: LT

## 2023-11-27 ENCOUNTER — APPOINTMENT (OUTPATIENT)
Dept: PAIN MANAGEMENT | Facility: CLINIC | Age: 68
End: 2023-11-27
Payer: MEDICARE

## 2023-11-27 VITALS — WEIGHT: 155 LBS | BODY MASS INDEX: 24.33 KG/M2 | HEIGHT: 67 IN

## 2023-11-27 PROCEDURE — 99204 OFFICE O/P NEW MOD 45 MIN: CPT

## 2023-12-11 ENCOUNTER — OUTPATIENT (OUTPATIENT)
Dept: OUTPATIENT SERVICES | Facility: HOSPITAL | Age: 68
LOS: 1 days | End: 2023-12-11
Payer: COMMERCIAL

## 2023-12-11 VITALS
HEART RATE: 58 BPM | DIASTOLIC BLOOD PRESSURE: 70 MMHG | WEIGHT: 154.98 LBS | SYSTOLIC BLOOD PRESSURE: 152 MMHG | RESPIRATION RATE: 16 BRPM | HEIGHT: 67 IN | OXYGEN SATURATION: 98 % | TEMPERATURE: 98 F

## 2023-12-11 DIAGNOSIS — Z01.818 ENCOUNTER FOR OTHER PREPROCEDURAL EXAMINATION: ICD-10-CM

## 2023-12-11 DIAGNOSIS — Z98.890 OTHER SPECIFIED POSTPROCEDURAL STATES: Chronic | ICD-10-CM

## 2023-12-11 DIAGNOSIS — M17.11 UNILATERAL PRIMARY OSTEOARTHRITIS, RIGHT KNEE: ICD-10-CM

## 2023-12-11 LAB
BLD GP AB SCN SERPL QL: SIGNIFICANT CHANGE UP
BLD GP AB SCN SERPL QL: SIGNIFICANT CHANGE UP

## 2023-12-11 RX ORDER — CHLORHEXIDINE GLUCONATE 213 G/1000ML
1 SOLUTION TOPICAL ONCE
Refills: 0 | Status: DISCONTINUED | OUTPATIENT
Start: 2023-12-19 | End: 2023-12-21

## 2023-12-11 RX ORDER — SODIUM CHLORIDE 9 MG/ML
3 INJECTION INTRAMUSCULAR; INTRAVENOUS; SUBCUTANEOUS EVERY 8 HOURS
Refills: 0 | Status: DISCONTINUED | OUTPATIENT
Start: 2023-12-19 | End: 2023-12-22

## 2023-12-11 NOTE — H&P PST ADULT - HISTORY OF PRESENT ILLNESS
68year old male with pmhx of ex-cigarette smoker, prostate cancer, right knee meniscus tear, right foot bone spur and bilateral ankle and knee OA presents with c/o intermittent right knee pain x 29years that has failed to resolve with conservative management and 2 arthroscopic surgeries. Patient is here today for presurgical testing for scheduled Right Total Knee Replacement on 12/19/2023

## 2023-12-11 NOTE — H&P PST ADULT - NSANTHOSAYNRD_GEN_A_CORE
No. ZEV screening performed.  STOP BANG Legend: 0-2 = LOW Risk; 3-4 = INTERMEDIATE Risk; 5-8 = HIGH Risk

## 2023-12-11 NOTE — H&P PST ADULT - NSICDXPASTSURGICALHX_GEN_ALL_CORE_FT
PAST SURGICAL HISTORY:  H/O arthroscopy of knee     History of ankle surgery     History of foot surgery

## 2023-12-11 NOTE — H&P PST ADULT - MUSCULOSKELETAL COMMENTS
Hx Right knee OA and torn meniscus - s/p arthroscopy, right ankle OA Right Knee Primary Osteoarthritis

## 2023-12-11 NOTE — H&P PST ADULT - PROBLEM SELECTOR PLAN 1
Patient scheduled for Right Total Knee Replacement on 12/19/2023  Written and oral preoperative instructions given to patient with understanding verbalized.   Instructions given to include using 4% chlorhexidine wash as directed starting 3days before day of surgery and inclusive of day of surgery.   Maintaining NPO status post midnight day before surgery  Stopping aspirin, NSAIDs, herbs, vitamins 7days before surgery   Patient is to expect a phone call day before surgery between the hours of 430- 630pm giving arrival time for surgery day.    Patient today with STOP bang score of 2, Low risk for ZEV  Type/Screen, Hgb A1C drawn today, results pending   MRSA swab done today, results pending   Aryan (PT) to call patient and discuss post operative Rehab plan of care

## 2023-12-11 NOTE — H&P PST ADULT - NSICDXPASTMEDICALHX_GEN_ALL_CORE_FT
PAST MEDICAL HISTORY:  Ex-cigarette smoker     OA (osteoarthritis)     OA (osteoarthritis) of ankle     Pinched nerve     Torn meniscus

## 2023-12-12 LAB
A1C WITH ESTIMATED AVERAGE GLUCOSE RESULT: 5.8 % — HIGH (ref 4–5.6)
A1C WITH ESTIMATED AVERAGE GLUCOSE RESULT: 5.8 % — HIGH (ref 4–5.6)
ESTIMATED AVERAGE GLUCOSE: 120 MG/DL — HIGH (ref 68–114)
ESTIMATED AVERAGE GLUCOSE: 120 MG/DL — HIGH (ref 68–114)
MRSA PCR RESULT.: SIGNIFICANT CHANGE UP
MRSA PCR RESULT.: SIGNIFICANT CHANGE UP
S AUREUS DNA NOSE QL NAA+PROBE: SIGNIFICANT CHANGE UP
S AUREUS DNA NOSE QL NAA+PROBE: SIGNIFICANT CHANGE UP

## 2023-12-12 PROCEDURE — G0463: CPT

## 2023-12-12 PROCEDURE — 83036 HEMOGLOBIN GLYCOSYLATED A1C: CPT

## 2023-12-14 NOTE — CHART NOTE - NSCHARTNOTEFT_GEN_A_CORE
PRE-TJR SOCIAL/FUNCTIONAL SCREENING Via:     Telephone Call  on 12/12/2023:  Preferred Language: English  Patient Telephone #:  [phone]  EMAIL ADDRESS: [email or web address]  Insurance:   case#[id number]   Pt stated Goal for Surgery : To be able to walk normal again  SURGERY DETAILS:  Sx Date:  12/19/2023                                                                                           Surgery Type:  Right Knee Replacement  Surgeon: Dr. Cuevas     SOCIAL HISTORY:  Live With:   alone in an Apartment  Stairs Required to Access (Street to Front Door) Residence:   Yes; 3  Once Inside Pt Residence:   To Access a Bathroom:      No Stairs Required     To Access a Bedroom Where Pt. Can Sleep:  No Stairs Required   Pt. Currently Has Formal Home Health Services:  No     MOBILITY HISTORY:   Baseline Ambulation- Pt is Independent in ambulation without an assistive Device  Pt. Owns Any Other Medical Equipment? Yes; patient received a rolling walker and 3-in-1 commode    MEDICAL HISTORY:  Pt has any History of Back Surgery:   No     Pt has any Allergies:    Yes; Sulfa  Patient denies diagnosis of sleep apnea or use of CPAP    Pt. Pharmacy Information:  Name:  [facility]                  Address:        [address] [facility]  Telephone #: [phone]    Pt. will Require Transportation to Home Upon Discharge: No, khan says his friend will drive him home    For Post-Acute Care:  Pt. prefers [facility] for post-acute needs    Pt was provided with pre- op education book "What to do before and after knee replacement " and referred to it during Pre-op education.   Pt. was provided with, and educated on, StretchStrap stretching & exercise device/strap. Pt. was provided with exercise program to facilitate range of motion and strength post-knee joint replacement. Pt. verbalized understanding.   All questions were answered, pt. has my phone number for follow up as needed. PRE-TJR SOCIAL/FUNCTIONAL SCREENING Via:     Telephone Call  on 12/12/2023:  Preferred Language: English  Patient Telephone #:  [phone]  EMAIL ADDRESS: [email or web address]  Insurance:   case#[id number]   Pt stated Goal for Surgery : To be able to walk normal again  SURGERY DETAILS:  Sx Date:  12/19/2023                                                                                           Surgery Type:  Right Knee Replacement  Surgeon: Dr. Cuevas     SOCIAL HISTORY:  Live With:   alone in an Apartment  Stairs Required to Access (Street to Front Door) Residence:   Yes; 3  Once Inside Pt Residence:   To Access a Bathroom:      No Stairs Required     To Access a Bedroom Where Pt. Can Sleep:  No Stairs Required   Pt. Currently Has Formal Home Health Services:  No     MOBILITY HISTORY:   Baseline Ambulation- Pt is Independent in ambulation without an assistive Device  Pt. Owns Any Other Medical Equipment? Yes; patient received a rolling walker and 3-in-1 commode    MEDICAL HISTORY:  Pt has any History of Back Surgery:   No     Pt has any Allergies:    Yes; Sulfa  Patient denies diagnosis of sleep apnea or use of CPAP    Pt. Pharmacy Information:  Name:  [facility]                  Address:        [address] [facility]  Telephone #: [phone]    Pt. will Require Transportation to Home Upon Discharge: No, khan says his friend will drive him home    For Post-Acute Care:  Pt. prefers [facility] Home for post-acute needs    Pt was provided with pre- op education book "What to do before and after knee replacement " and referred to it during Pre-op education.   Pt. was provided with, and educated on, StretchStrap stretching & exercise device/strap. Pt. was provided with exercise program to facilitate range of motion and strength post-knee joint replacement. Pt. verbalized understanding.   All questions were answered, pt. has my phone number for follow up as needed.

## 2023-12-18 ENCOUNTER — TRANSCRIPTION ENCOUNTER (OUTPATIENT)
Age: 68
End: 2023-12-18

## 2023-12-19 ENCOUNTER — INPATIENT (INPATIENT)
Facility: HOSPITAL | Age: 68
LOS: 2 days | Discharge: HOME HEALTH | DRG: 470 | End: 2023-12-22
Attending: ORTHOPAEDIC SURGERY | Admitting: ORTHOPAEDIC SURGERY
Payer: COMMERCIAL

## 2023-12-19 ENCOUNTER — TRANSCRIPTION ENCOUNTER (OUTPATIENT)
Age: 68
End: 2023-12-19

## 2023-12-19 VITALS
WEIGHT: 154.98 LBS | OXYGEN SATURATION: 98 % | RESPIRATION RATE: 16 BRPM | SYSTOLIC BLOOD PRESSURE: 150 MMHG | TEMPERATURE: 98 F | HEART RATE: 80 BPM | HEIGHT: 67 IN | DIASTOLIC BLOOD PRESSURE: 77 MMHG

## 2023-12-19 DIAGNOSIS — Z98.890 OTHER SPECIFIED POSTPROCEDURAL STATES: Chronic | ICD-10-CM

## 2023-12-19 DIAGNOSIS — M19.079 PRIMARY OSTEOARTHRITIS, UNSPECIFIED ANKLE AND FOOT: ICD-10-CM

## 2023-12-19 DIAGNOSIS — G89.18 OTHER ACUTE POSTPROCEDURAL PAIN: ICD-10-CM

## 2023-12-19 DIAGNOSIS — M17.11 UNILATERAL PRIMARY OSTEOARTHRITIS, RIGHT KNEE: ICD-10-CM

## 2023-12-19 LAB
ANION GAP SERPL CALC-SCNC: 5 MMOL/L — SIGNIFICANT CHANGE UP (ref 5–17)
ANION GAP SERPL CALC-SCNC: 5 MMOL/L — SIGNIFICANT CHANGE UP (ref 5–17)
BLD GP AB SCN SERPL QL: SIGNIFICANT CHANGE UP
BLD GP AB SCN SERPL QL: SIGNIFICANT CHANGE UP
BUN SERPL-MCNC: 15 MG/DL — SIGNIFICANT CHANGE UP (ref 7–18)
BUN SERPL-MCNC: 15 MG/DL — SIGNIFICANT CHANGE UP (ref 7–18)
CALCIUM SERPL-MCNC: 9 MG/DL — SIGNIFICANT CHANGE UP (ref 8.4–10.5)
CALCIUM SERPL-MCNC: 9 MG/DL — SIGNIFICANT CHANGE UP (ref 8.4–10.5)
CHLORIDE SERPL-SCNC: 106 MMOL/L — SIGNIFICANT CHANGE UP (ref 96–108)
CHLORIDE SERPL-SCNC: 106 MMOL/L — SIGNIFICANT CHANGE UP (ref 96–108)
CO2 SERPL-SCNC: 29 MMOL/L — SIGNIFICANT CHANGE UP (ref 22–31)
CO2 SERPL-SCNC: 29 MMOL/L — SIGNIFICANT CHANGE UP (ref 22–31)
CREAT SERPL-MCNC: 0.96 MG/DL — SIGNIFICANT CHANGE UP (ref 0.5–1.3)
CREAT SERPL-MCNC: 0.96 MG/DL — SIGNIFICANT CHANGE UP (ref 0.5–1.3)
EGFR: 86 ML/MIN/1.73M2 — SIGNIFICANT CHANGE UP
EGFR: 86 ML/MIN/1.73M2 — SIGNIFICANT CHANGE UP
GLUCOSE BLDC GLUCOMTR-MCNC: 93 MG/DL — SIGNIFICANT CHANGE UP (ref 70–99)
GLUCOSE BLDC GLUCOMTR-MCNC: 93 MG/DL — SIGNIFICANT CHANGE UP (ref 70–99)
GLUCOSE SERPL-MCNC: 84 MG/DL — SIGNIFICANT CHANGE UP (ref 70–99)
GLUCOSE SERPL-MCNC: 84 MG/DL — SIGNIFICANT CHANGE UP (ref 70–99)
HCT VFR BLD CALC: 31.6 % — LOW (ref 39–50)
HCT VFR BLD CALC: 31.6 % — LOW (ref 39–50)
HGB BLD-MCNC: 9.5 G/DL — LOW (ref 13–17)
HGB BLD-MCNC: 9.5 G/DL — LOW (ref 13–17)
MCHC RBC-ENTMCNC: 24.5 PG — LOW (ref 27–34)
MCHC RBC-ENTMCNC: 24.5 PG — LOW (ref 27–34)
MCHC RBC-ENTMCNC: 30.1 GM/DL — LOW (ref 32–36)
MCHC RBC-ENTMCNC: 30.1 GM/DL — LOW (ref 32–36)
MCV RBC AUTO: 81.4 FL — SIGNIFICANT CHANGE UP (ref 80–100)
MCV RBC AUTO: 81.4 FL — SIGNIFICANT CHANGE UP (ref 80–100)
NRBC # BLD: 0 /100 WBCS — SIGNIFICANT CHANGE UP (ref 0–0)
NRBC # BLD: 0 /100 WBCS — SIGNIFICANT CHANGE UP (ref 0–0)
PLATELET # BLD AUTO: 284 K/UL — SIGNIFICANT CHANGE UP (ref 150–400)
PLATELET # BLD AUTO: 284 K/UL — SIGNIFICANT CHANGE UP (ref 150–400)
POTASSIUM SERPL-MCNC: 4 MMOL/L — SIGNIFICANT CHANGE UP (ref 3.5–5.3)
POTASSIUM SERPL-MCNC: 4 MMOL/L — SIGNIFICANT CHANGE UP (ref 3.5–5.3)
POTASSIUM SERPL-SCNC: 4 MMOL/L — SIGNIFICANT CHANGE UP (ref 3.5–5.3)
POTASSIUM SERPL-SCNC: 4 MMOL/L — SIGNIFICANT CHANGE UP (ref 3.5–5.3)
RBC # BLD: 3.88 M/UL — LOW (ref 4.2–5.8)
RBC # BLD: 3.88 M/UL — LOW (ref 4.2–5.8)
RBC # FLD: 14.5 % — SIGNIFICANT CHANGE UP (ref 10.3–14.5)
RBC # FLD: 14.5 % — SIGNIFICANT CHANGE UP (ref 10.3–14.5)
SODIUM SERPL-SCNC: 140 MMOL/L — SIGNIFICANT CHANGE UP (ref 135–145)
SODIUM SERPL-SCNC: 140 MMOL/L — SIGNIFICANT CHANGE UP (ref 135–145)
WBC # BLD: 5.84 K/UL — SIGNIFICANT CHANGE UP (ref 3.8–10.5)
WBC # BLD: 5.84 K/UL — SIGNIFICANT CHANGE UP (ref 3.8–10.5)
WBC # FLD AUTO: 5.84 K/UL — SIGNIFICANT CHANGE UP (ref 3.8–10.5)
WBC # FLD AUTO: 5.84 K/UL — SIGNIFICANT CHANGE UP (ref 3.8–10.5)

## 2023-12-19 PROCEDURE — 88311 DECALCIFY TISSUE: CPT | Mod: 26

## 2023-12-19 PROCEDURE — 20900 REMOVAL OF BONE FOR GRAFT: CPT | Mod: AS,RT

## 2023-12-19 PROCEDURE — 73560 X-RAY EXAM OF KNEE 1 OR 2: CPT | Mod: 26

## 2023-12-19 PROCEDURE — 88305 TISSUE EXAM BY PATHOLOGIST: CPT | Mod: 26

## 2023-12-19 PROCEDURE — 73560 X-RAY EXAM OF KNEE 1 OR 2: CPT | Mod: 26,RT

## 2023-12-19 PROCEDURE — 27447 TOTAL KNEE ARTHROPLASTY: CPT | Mod: AS,RT

## 2023-12-19 PROCEDURE — 27350 REMOVAL OF KNEECAP: CPT | Mod: AS,59,RT

## 2023-12-19 DEVICE — CEMENT SIMPLEX P 40GM: Type: IMPLANTABLE DEVICE | Status: FUNCTIONAL

## 2023-12-19 DEVICE — ANCHOR JUGGERKNOT 2.9MM: Type: IMPLANTABLE DEVICE | Status: FUNCTIONAL

## 2023-12-19 DEVICE — INSERT TIB BEARING PS X3 SZ 5 9MM: Type: IMPLANTABLE DEVICE | Status: FUNCTIONAL

## 2023-12-19 DEVICE — IMPLANTABLE DEVICE: Type: IMPLANTABLE DEVICE | Status: FUNCTIONAL

## 2023-12-19 DEVICE — FEM DIST FIXATION PEG MOD TKS: Type: IMPLANTABLE DEVICE | Status: FUNCTIONAL

## 2023-12-19 DEVICE — BASEPLATE TIB UNIV TRIATHLON SZ 5: Type: IMPLANTABLE DEVICE | Status: FUNCTIONAL

## 2023-12-19 DEVICE — COMP FEM TRIATHLON PS SZ 4 RT: Type: IMPLANTABLE DEVICE | Status: FUNCTIONAL

## 2023-12-19 DEVICE — PIN STRL 1/8 X 3.5IN LONG: Type: IMPLANTABLE DEVICE | Status: FUNCTIONAL

## 2023-12-19 DEVICE — COMP PATELLA ASYMMETRIC X3 32X10MM: Type: IMPLANTABLE DEVICE | Status: FUNCTIONAL

## 2023-12-19 DEVICE — KIT JUGGERNOT DISP 2.9MM: Type: IMPLANTABLE DEVICE | Status: FUNCTIONAL

## 2023-12-19 RX ORDER — OXYCODONE HYDROCHLORIDE 5 MG/1
5 TABLET ORAL EVERY 4 HOURS
Refills: 0 | Status: DISCONTINUED | OUTPATIENT
Start: 2023-12-19 | End: 2023-12-22

## 2023-12-19 RX ORDER — ACETAMINOPHEN 500 MG
1000 TABLET ORAL EVERY 8 HOURS
Refills: 0 | Status: COMPLETED | OUTPATIENT
Start: 2023-12-19 | End: 2023-12-20

## 2023-12-19 RX ORDER — ONDANSETRON 8 MG/1
4 TABLET, FILM COATED ORAL EVERY 6 HOURS
Refills: 0 | Status: DISCONTINUED | OUTPATIENT
Start: 2023-12-19 | End: 2023-12-22

## 2023-12-19 RX ORDER — FERROUS SULFATE 325(65) MG
325 TABLET ORAL DAILY
Refills: 0 | Status: DISCONTINUED | OUTPATIENT
Start: 2023-12-19 | End: 2023-12-22

## 2023-12-19 RX ORDER — TRAMADOL HYDROCHLORIDE 50 MG/1
50 TABLET ORAL ONCE
Refills: 0 | Status: DISCONTINUED | OUTPATIENT
Start: 2023-12-19 | End: 2023-12-19

## 2023-12-19 RX ORDER — MAGNESIUM HYDROXIDE 400 MG/1
30 TABLET, CHEWABLE ORAL DAILY
Refills: 0 | Status: DISCONTINUED | OUTPATIENT
Start: 2023-12-19 | End: 2023-12-22

## 2023-12-19 RX ORDER — ACETAMINOPHEN 500 MG
1000 TABLET ORAL EVERY 6 HOURS
Refills: 0 | Status: DISCONTINUED | OUTPATIENT
Start: 2023-12-19 | End: 2023-12-19

## 2023-12-19 RX ORDER — PANTOPRAZOLE SODIUM 20 MG/1
40 TABLET, DELAYED RELEASE ORAL
Refills: 0 | Status: DISCONTINUED | OUTPATIENT
Start: 2023-12-19 | End: 2023-12-22

## 2023-12-19 RX ORDER — POLYETHYLENE GLYCOL 3350 17 G/17G
17 POWDER, FOR SOLUTION ORAL AT BEDTIME
Refills: 0 | Status: DISCONTINUED | OUTPATIENT
Start: 2023-12-19 | End: 2023-12-22

## 2023-12-19 RX ORDER — SODIUM CHLORIDE 9 MG/ML
1000 INJECTION, SOLUTION INTRAVENOUS
Refills: 0 | Status: DISCONTINUED | OUTPATIENT
Start: 2023-12-19 | End: 2023-12-19

## 2023-12-19 RX ORDER — ACETAMINOPHEN 500 MG
1000 TABLET ORAL EVERY 8 HOURS
Refills: 0 | Status: DISCONTINUED | OUTPATIENT
Start: 2023-12-19 | End: 2023-12-19

## 2023-12-19 RX ORDER — CEFAZOLIN SODIUM 1 G
2000 VIAL (EA) INJECTION EVERY 8 HOURS
Refills: 0 | Status: COMPLETED | OUTPATIENT
Start: 2023-12-19 | End: 2023-12-20

## 2023-12-19 RX ORDER — FENTANYL CITRATE 50 UG/ML
25 INJECTION INTRAVENOUS
Refills: 0 | Status: DISCONTINUED | OUTPATIENT
Start: 2023-12-19 | End: 2023-12-19

## 2023-12-19 RX ORDER — SENNA PLUS 8.6 MG/1
2 TABLET ORAL AT BEDTIME
Refills: 0 | Status: DISCONTINUED | OUTPATIENT
Start: 2023-12-19 | End: 2023-12-22

## 2023-12-19 RX ORDER — TRAMADOL HYDROCHLORIDE 50 MG/1
50 TABLET ORAL EVERY 8 HOURS
Refills: 0 | Status: DISCONTINUED | OUTPATIENT
Start: 2023-12-19 | End: 2023-12-22

## 2023-12-19 RX ORDER — ENOXAPARIN SODIUM 100 MG/ML
30 INJECTION SUBCUTANEOUS EVERY 12 HOURS
Refills: 0 | Status: DISCONTINUED | OUTPATIENT
Start: 2023-12-20 | End: 2023-12-22

## 2023-12-19 RX ORDER — OXYCODONE HYDROCHLORIDE 5 MG/1
5 TABLET ORAL ONCE
Refills: 0 | Status: DISCONTINUED | OUTPATIENT
Start: 2023-12-19 | End: 2023-12-19

## 2023-12-19 RX ORDER — ACETAMINOPHEN 500 MG
1000 TABLET ORAL ONCE
Refills: 0 | Status: DISCONTINUED | OUTPATIENT
Start: 2023-12-19 | End: 2023-12-19

## 2023-12-19 RX ORDER — SODIUM CHLORIDE 9 MG/ML
1000 INJECTION INTRAMUSCULAR; INTRAVENOUS; SUBCUTANEOUS
Refills: 0 | Status: DISCONTINUED | OUTPATIENT
Start: 2023-12-19 | End: 2023-12-21

## 2023-12-19 RX ORDER — FENTANYL CITRATE 50 UG/ML
50 INJECTION INTRAVENOUS
Refills: 0 | Status: DISCONTINUED | OUTPATIENT
Start: 2023-12-19 | End: 2023-12-19

## 2023-12-19 RX ORDER — KETOROLAC TROMETHAMINE 30 MG/ML
15 SYRINGE (ML) INJECTION EVERY 6 HOURS
Refills: 0 | Status: DISCONTINUED | OUTPATIENT
Start: 2023-12-19 | End: 2023-12-22

## 2023-12-19 RX ADMIN — SODIUM CHLORIDE 110 MILLILITER(S): 9 INJECTION INTRAMUSCULAR; INTRAVENOUS; SUBCUTANEOUS at 16:41

## 2023-12-19 RX ADMIN — SODIUM CHLORIDE 110 MILLILITER(S): 9 INJECTION INTRAMUSCULAR; INTRAVENOUS; SUBCUTANEOUS at 23:06

## 2023-12-19 RX ADMIN — Medication 100 MILLIGRAM(S): at 21:01

## 2023-12-19 RX ADMIN — OXYCODONE HYDROCHLORIDE 5 MILLIGRAM(S): 5 TABLET ORAL at 17:58

## 2023-12-19 RX ADMIN — Medication 15 MILLIGRAM(S): at 23:06

## 2023-12-19 RX ADMIN — Medication 1000 MILLIGRAM(S): at 21:02

## 2023-12-19 RX ADMIN — TRAMADOL HYDROCHLORIDE 50 MILLIGRAM(S): 50 TABLET ORAL at 21:02

## 2023-12-19 RX ADMIN — SODIUM CHLORIDE 3 MILLILITER(S): 9 INJECTION INTRAMUSCULAR; INTRAVENOUS; SUBCUTANEOUS at 22:00

## 2023-12-19 RX ADMIN — TRAMADOL HYDROCHLORIDE 50 MILLIGRAM(S): 50 TABLET ORAL at 22:00

## 2023-12-19 RX ADMIN — PANTOPRAZOLE SODIUM 40 MILLIGRAM(S): 20 TABLET, DELAYED RELEASE ORAL at 16:58

## 2023-12-19 RX ADMIN — Medication 325 MILLIGRAM(S): at 16:58

## 2023-12-19 RX ADMIN — Medication 1000 MILLIGRAM(S): at 22:00

## 2023-12-19 RX ADMIN — Medication 15 MILLIGRAM(S): at 23:30

## 2023-12-19 RX ADMIN — TRAMADOL HYDROCHLORIDE 50 MILLIGRAM(S): 50 TABLET ORAL at 10:19

## 2023-12-19 RX ADMIN — Medication 1000 MILLIGRAM(S): at 16:58

## 2023-12-19 NOTE — PHYSICAL THERAPY INITIAL EVALUATION ADULT - DIAGNOSIS, PT EVAL
(ICF Model) Pt. present w/deficits in Body Structures/Function (Impairments), incl: Strength, Balance, sensation leading to deficits in performing the below noted Activities (Limitations).

## 2023-12-19 NOTE — PHYSICAL THERAPY INITIAL EVALUATION ADULT - PASSIVE RANGE OF MOTION EXAMINATION, REHAB EVAL
except Rt knee 0-90 deg flx/bilateral upper extremity Passive ROM was WFL (within functional limits)/bilateral lower extremity Passive ROM was WFL (within functional limits)

## 2023-12-19 NOTE — PHYSICAL THERAPY INITIAL EVALUATION ADULT - GENERAL OBSERVATIONS, REHAB EVAL
Consult received, chart reviewed. Patient received supine in bed, NAD, +SCDs IV. Patient agreed to EVALUATION from Physical Therapist. He reports feeling cold with mild shivering.  Reports a lot of stiffness pain in Rt knee and suprapubic pain and firmness (pt still due to void) and pt eager to be OOB. He reports feeling cold with mild shivering. Consult received, chart reviewed. Patient received supine in bed, NAD, +SCDs IV. Patient agreed to EVALUATION from Physical Therapist. He reports feeling cold with mild shivering.  Reports a lot of stiffness pain in Rt knee and suprapubic pain and firmness (pt still due to void) and pt eager to be OOB.

## 2023-12-19 NOTE — CONSULT NOTE ADULT - PROBLEM SELECTOR RECOMMENDATION 9
Pt with acute pain right knee pain which is somatic and neuropathic in nature s/p elective right knee total knee replacement 12/19/23 POD 0.   Opioid pain recommendations   - Tramadol 50mg PO q 8 hours. Monitor for sedation/ respiratory depression.   - Continue Oxycodone 5 mg PO q 4 hours PRN moderate pain. Monitor for sedation/ respiratory depression.   Non-opioid pain recommendations   - Continue Toradol 15 mg IVP q 6 hours PRN severe pain  - Acetaminophen 1 gram PO q 8 hours for 24 hours only. Monitor LFTs  - Celebrex 200mg PO daily  Bowel Regimen  - Miralax 17G PO daily  - Senna 2 tablets at bedtime for constipation  Mild pain 1 - 3  - Non-pharmacological pain treatment recommendations  - Warm/ Cool packs PRN   - Repositioning extremity, elevation, imagery, relaxation, distraction.  - Physical therapy OOB if no contraindications   Recommendations discussed with primary team and RN.  Upon discharge – dc on Celebrex 200mg po daily and Percocet 5/325mg po q 6 hours prn for 1 week. Pt to take OTC stool softeners Pt with acute pain right knee pain which is somatic and neuropathic in nature s/p elective right knee total knee replacement 12/19/23 POD 0.   Opioid pain recommendations   - Tramadol 50mg PO q 8 hours. Monitor for sedation/ respiratory depression.   - Continue Oxycodone 5 mg PO q 4 hours PRN moderate pain. Monitor for sedation/ respiratory depression.   Non-opioid pain recommendations   - Continue Toradol 15 mg IVP q 6 hours PRN severe pain - monitor H/H  - Acetaminophen 1 gram PO q 8 hours for 24 hours only. Monitor LFTs  - Celebrex 200mg PO daily  Bowel Regimen  - Miralax 17G PO daily  - Senna 2 tablets at bedtime for constipation  Mild pain 1 - 3  - Non-pharmacological pain treatment recommendations  - Warm/ Cool packs PRN   - Repositioning extremity, elevation, imagery, relaxation, distraction.  - Physical therapy OOB if no contraindications   Recommendations discussed with primary team and RN.  Upon discharge – dc on Celebrex 200mg po daily and Percocet 5/325mg po q 6 hours prn for 1 week. Pt to take OTC stool softeners

## 2023-12-19 NOTE — BRIEF OPERATIVE NOTE - NSICDXBRIEFPROCEDURE_GEN_ALL_CORE_FT
PROCEDURES:  Right total knee replacement 11-Dec-2023 14:00:36 S/p Right total knee replacement in 12/19/23 Jessica Dey

## 2023-12-19 NOTE — PHYSICAL THERAPY INITIAL EVALUATION ADULT - ADDITIONAL COMMENTS
rolling walker and 3:1 commode delivered to home, pt had rolling walker with posterior skis bedside.

## 2023-12-19 NOTE — PATIENT PROFILE ADULT - NSPROGENSOURCEINFO_GEN_A_NUR
Occupational Therapy   FIM Scores    Kristina Aguirre   MRN: [id number]      04/03/18 0730   Transfers   Bed/Chair/WC 3   Toilet 4   Tub 2   Self Care   Eating 5   Grooming 5   Bathing 3   Dressing-Upper 2   Dressing-Lower 3   Toileting 3   Communication   Comprehension 4   Mode A   Expression 4   Mode V   Social Cognition   Social Interaction 4   Problem Solving 4   Memory 3     CAROLYN Dixon   4/3/2018      Addendum( adding a detail to already initiated content)documented on 4/20/2018 for services provided on 4/3/18 by CAROLYN Dixon due to error.     patient

## 2023-12-19 NOTE — PHYSICAL THERAPY INITIAL EVALUATION ADULT - LIVES WITH, PROFILE
apartment, 3 stairs to access (but reported there is a lift, to bring up wheelchairs,  present to avoid having to negotiate stairs) elevator present and no additional stairs required

## 2023-12-19 NOTE — PROGRESS NOTE ADULT - SUBJECTIVE AND OBJECTIVE BOX
Orthopedic Surgery     68yMale    Diagnosis:  S/p RIGHT Total Knee Replacement POD#0    24hr interval: none  Patient was seen and evaluated at bedside. Patient with no acute complaints.   Pain is  well controlled.      Denies CP/SOB, dyspnea, paresthesias, N/V/D, palpitations.     Vital Signs Last 24 Hrs  T(C): 36.4 (19 Dec 2023 17:50), Max: 36.8 (19 Dec 2023 09:45)  T(F): 97.6 (19 Dec 2023 17:50), Max: 98.2 (19 Dec 2023 09:45)  HR: 57 (19 Dec 2023 17:20) (51 - 80)  BP: 146/71 (19 Dec 2023 17:50) (111/81 - 161/82)  BP(mean): 99 (19 Dec 2023 16:04) (83 - 99)  RR: 18 (19 Dec 2023 16:49) (12 - 18)  SpO2: 98% (19 Dec 2023 17:50) (96% - 100%)    Parameters below as of 19 Dec 2023 17:50  Patient On (Oxygen Delivery Method): room air      I&O's Detail    19 Dec 2023 07:01  -  19 Dec 2023 18:50  --------------------------------------------------------  IN:    Lactated Ringers Bolus: 1000 mL  Total IN: 1000 mL    OUT:    Blood Loss (mL): 100 mL  Total OUT: 100 mL    Total NET: 900 mL          Physical Exam:    General: AAOx3, NAD, resting comfortably in bed.  MSK:  Right knee:  Dressing is C/D/I.   Skin is pink and warm.    No erythema.   No wound drainage.   Lower extremities:  No calf tenderness, calves are soft.   2+pulses. NVI. 5/5 Strength of EHL/TA/gastrocnemius B/L.    Good capillary refill. SILT.                          9.5    5.84  )-----------( 284      ( 19 Dec 2023 15:20 )             31.6     12-19    140  |  106  |  15  ----------------------------<  84  4.0   |  29  |  0.96    Ca    9.0      19 Dec 2023 15:20        Impression:  68yMale S/p Right Total Knee Replacement POD#0  Plan:  -  Pain management  -  Dvt prophylaxis with Lovenox  -  Daily Physical Therapy:  WBAT of the right lower extremity with appropriate assistive device  -  Discharge planning  -  Continue with Post-op Antibiotics x 24hrs  -  Case d/w Dr. Lima Orthopedic Surgery     68yMale    Diagnosis:  S/p RIGHT Total Knee Replacement POD#0    24hr interval: none  Patient was seen and evaluated at bedside. Patient with no acute complaints.   Pain is  well controlled.      Denies CP/SOB, dyspnea, paresthesias, N/V/D, palpitations.     Vital Signs Last 24 Hrs  T(C): 36.4 (19 Dec 2023 17:50), Max: 36.8 (19 Dec 2023 09:45)  T(F): 97.6 (19 Dec 2023 17:50), Max: 98.2 (19 Dec 2023 09:45)  HR: 57 (19 Dec 2023 17:20) (51 - 80)  BP: 146/71 (19 Dec 2023 17:50) (111/81 - 161/82)  BP(mean): 99 (19 Dec 2023 16:04) (83 - 99)  RR: 18 (19 Dec 2023 16:49) (12 - 18)  SpO2: 98% (19 Dec 2023 17:50) (96% - 100%)    Parameters below as of 19 Dec 2023 17:50  Patient On (Oxygen Delivery Method): room air      I&O's Detail    19 Dec 2023 07:01  -  19 Dec 2023 18:50  --------------------------------------------------------  IN:    Lactated Ringers Bolus: 1000 mL  Total IN: 1000 mL    OUT:    Blood Loss (mL): 100 mL  Total OUT: 100 mL    Total NET: 900 mL          Physical Exam:    General: AAOx3, NAD, resting comfortably in bed.  MSK:  Right knee:  Dressing is C/D/I.   Skin is pink and warm.    No erythema.   No wound drainage.   Lower extremities:  No calf tenderness, calves are soft.   2+pulses. NVI. 5/5 Strength of EHL/TA/gastrocnemius B/L.    Good capillary refill. SILT.                          9.5    5.84  )-----------( 284      ( 19 Dec 2023 15:20 )             31.6     12-19    140  |  106  |  15  ----------------------------<  84  4.0   |  29  |  0.96    Ca    9.0      19 Dec 2023 15:20        Impression:  68yMale S/p Right Total Knee Replacement POD#0  Plan:  -  Pain management  -  Dvt prophylaxis with Lovenox  -  Daily Physical Therapy:  WBAT of the right lower extremity with appropriate assistive device  -  Discharge planning home  -  Continue with Post-op Antibiotics x 24hrs  -  Case d/w Dr. Lima

## 2023-12-19 NOTE — PROGRESS NOTE ADULT - SUBJECTIVE AND OBJECTIVE BOX
68yMale    Diagnosis:  S/p R Total Knee Replacement POD# 0    Patient was seen and evaluated at bedside. Patient with no acute complaints.   Pain is  well controlled to the RLE.   Denies CP/SOB, dyspnea, paresthesias, N/V/D, palpitations.     Vital Signs Last 24 Hrs  T(C): 36.4 (19 Dec 2023 17:50), Max: 36.8 (19 Dec 2023 09:45)  T(F): 97.6 (19 Dec 2023 17:50), Max: 98.2 (19 Dec 2023 09:45)  HR: 57 (19 Dec 2023 17:20) (51 - 80)  BP: 146/71 (19 Dec 2023 17:50) (111/81 - 161/82)  BP(mean): 99 (19 Dec 2023 16:04) (83 - 99)  RR: 18 (19 Dec 2023 16:49) (12 - 18)  SpO2: 98% (19 Dec 2023 17:50) (96% - 100%)    Parameters below as of 19 Dec 2023 17:50  Patient On (Oxygen Delivery Method): room air      I&O's Detail    19 Dec 2023 07:01  -  19 Dec 2023 19:00  --------------------------------------------------------  IN:    Lactated Ringers Bolus: 1000 mL  Total IN: 1000 mL    OUT:    Blood Loss (mL): 100 mL  Total OUT: 100 mL    Total NET: 900 mL          Physical Exam:    General: AAOx3, NAD, resting comfortably in bed.    R KNEE:  Dressing is C/D/I. Skin is pink and warm. Staples intact. No erythema. SILT.  Wound with no drainage, healing well.   Lower extremity:  No calf tenderness, calves are soft. 2+pulses. NVI. (+)EHL/FHL/ADF/APF.  Good capillary refill.                           9.5    5.84  )-----------( 284      ( 19 Dec 2023 15:20 )             31.6     12-19    140  |  106  |  15  ----------------------------<  84  4.0   |  29  |  0.96    Ca    9.0      19 Dec 2023 15:20        Impression:  67 y/o M S/p R Total Knee Replacement POD# 0  Plan:  -  Pain control  -  DVT prophylaxis  -  Daily Physical Therapy:  WBAT  to RLE  -  Discharge planning: Home Vs. Rehab pending Physical therapy eval.  -  Heel bump to RLE  -  Incentive Spirometer  -  Continue with Post-op Antibiotics x 24hrs  -  Case d/w Dr. Cuevas

## 2023-12-19 NOTE — PHYSICAL THERAPY INITIAL EVALUATION ADULT - PATIENT PROFILE REVIEW, REHAB EVAL
including labs and imaging. RN advised pt. received meds prior to session, pt currently icing knee./yes

## 2023-12-19 NOTE — PHYSICAL THERAPY INITIAL EVALUATION ADULT - GAIT DEVIATIONS NOTED, PT EVAL
decreased dionicio/decreased velocity of limb motion/decreased step length/decreased stride length/decreased weight-shifting ability

## 2023-12-19 NOTE — PHYSICAL THERAPY INITIAL EVALUATION ADULT - ACTIVE RANGE OF MOTION EXAMINATION, REHAB EVAL
except Rt hi p~1/3 range, Rt knee ~45 deg arc from flexion in sitting EOB/bilateral upper extremity Active ROM was WFL (within functional limits)/bilateral  lower extremity Active ROM was WFL (within functional limits)

## 2023-12-19 NOTE — PHYSICAL THERAPY INITIAL EVALUATION ADULT - GAIT DISTANCE, PT EVAL
Initially only able to shift weight on RLE, then pt reported increasing dizziness and feeling cold, w/shivering and required to be layed back down in bed with improvement (see vitals flowsheet). During writing this eval, RN advised that pt had 655ml on bladder scan and requested PT to help stand pt. as he felt need to void. Pt stood got OOB and stood with Min A w/ rolling walker and was able to slowly void ~250ml. the then amb ~3ft laterally to lt toward HOB. He denied any dizziness, but felt he need to lay back down. Still with minor shivering, but reported some suprapubic relief (he was left with RN bedside). Food tray then came bedside.

## 2023-12-19 NOTE — CONSULT NOTE ADULT - SUBJECTIVE AND OBJECTIVE BOX
Source of information: QASIM KAUR, Chart review  Patient language: English  : n/a    HPI:  68year old male with pmhx of ex-cigarette smoker, prostate cancer, right knee meniscus tear, right foot bone spur and bilateral ankle and knee OA presents with c/o intermittent right knee pain x 29years that has failed to resolve with conservative management and 2 arthroscopic surgeries. Patient is here today for presurgical testing for scheduled Right Total Knee Replacement on 12/19/2023 (11 Dec 2023 13:45)      Patient is a 68y old  Male who presents with a chief complaint of Right Total Knee Replacement - Patients postoperative goals are better quality of life, improved mobility, improvements of activities of daily living and reduced knee pain. (11 Dec 2023 13:45)      Right total knee replacement.  S/p Right total knee replacement in 12/19/23     Pt is admitted for elective right total knee replacement.   Pt seen and examined at bedside. Reports pain score ***  SCALE USED: (1-10 VNRS). Pt describes pain as .... radiating to... alleviated by pain medication... exacerbated by movement... Pt tolerating PO diet. Denies lethargy, chest pain, SOB, nausea, vomiting, constipation. Reports last BM ***. Patient stated goal for pain control: to be able to take deep breaths, get out of bed to chair and ambulate with tolerable pain control. Pt denies taking medications for pain at home.     PAST MEDICAL & SURGICAL HISTORY:  Pinched nerve    OA (osteoarthritis)    Torn meniscus    OA (osteoarthritis) of ankle    Ex-cigarette smoker    H/O arthroscopy of knee    History of ankle surgery    History of foot surgery    FAMILY HISTORY:    Social History:   [ ] Denies ETOH use, illicit drug use and smoking    Allergies    sulfa drugs (Hives)    MEDICATIONS  (STANDING):  acetaminophen     Tablet .. 1000 milliGRAM(s) Oral every 6 hours  ceFAZolin   IVPB 2000 milliGRAM(s) IV Intermittent every 8 hours  chlorhexidine 2% Cloths 1 Application(s) Topical once  ferrous    sulfate 325 milliGRAM(s) Oral daily  pantoprazole    Tablet 40 milliGRAM(s) Oral before breakfast  polyethylene glycol 3350 17 Gram(s) Oral at bedtime  senna 2 Tablet(s) Oral at bedtime  sodium chloride 0.9% lock flush 3 milliLiter(s) IV Push every 8 hours  sodium chloride 0.9%. 1000 milliLiter(s) (110 mL/Hr) IV Continuous <Continuous>  traMADol 50 milliGRAM(s) Oral every 8 hours    MEDICATIONS  (PRN):  ketorolac   Injectable 15 milliGRAM(s) IV Push every 6 hours PRN Severe Pain (7 - 10)  magnesium hydroxide Suspension 30 milliLiter(s) Oral daily PRN Constipation  ondansetron Injectable 4 milliGRAM(s) IV Push every 6 hours PRN Nausea and/or Vomiting  oxyCODONE    IR 5 milliGRAM(s) Oral every 4 hours PRN Moderate Pain (4 - 6)      Vital Signs Last 24 Hrs  T(C): 36.3 (19 Dec 2023 16:23), Max: 36.8 (19 Dec 2023 09:45)  T(F): 97.3 (19 Dec 2023 16:23), Max: 98.2 (19 Dec 2023 09:45)  HR: 59 (19 Dec 2023 16:23) (51 - 80)  BP: 151/78 (19 Dec 2023 16:23) (111/81 - 151/78)  BP(mean): 99 (19 Dec 2023 16:04) (83 - 99)  RR: 18 (19 Dec 2023 16:23) (12 - 18)  SpO2: 96% (19 Dec 2023 16:23) (96% - 100%)    Parameters below as of 19 Dec 2023 16:23  Patient On (Oxygen Delivery Method): room air    LABS: Reviewed.                          9.5    5.84  )-----------( 284      ( 19 Dec 2023 15:20 )             31.6     12-19    140  |  106  |  15  ----------------------------<  84  4.0   |  29  |  0.96    Ca    9.0      19 Dec 2023 15:20    Urinalysis Basic - ( 19 Dec 2023 15:20 )    Color: x / Appearance: x / SG: x / pH: x  Gluc: 84 mg/dL / Ketone: x  / Bili: x / Urobili: x   Blood: x / Protein: x / Nitrite: x   Leuk Esterase: x / RBC: x / WBC x   Sq Epi: x / Non Sq Epi: x / Bacteria: x    CAPILLARY BLOOD GLUCOSE    POCT Blood Glucose.: 93 mg/dL (19 Dec 2023 15:22)      Radiology: Reviewed.   < from: Xray Knee 3 Views, Right (06.03.23 @ 15:25) >    ACC: [id number] EXAM:  XR KNEE AP LAT OBL 3 VIEWS RT   ORDERED BY: MARIBEL VICTORIA     PROCEDURE DATE:  06/03/2023      INTERPRETATION:  Right knee. 3 views. Patient has local pain.    There is a mildly effusion.    There are mild to early moderate degenerative findings. No fracture.    IMPRESSION: Mild effusion. Mild to moderate degeneration. No fracture.    --- End of Report ---    EVELYN CRISTINA MD; Attending Radiologist  This document has been electronically signed. Jun3 2023  3:42PM    < end of copied text >    < from: CT Lumbar Spine No Cont (05.21.23 @ 19:21) >  ACC: [id number] EXAM:  CT LUMBAR SPINE   ORDERED BY: GUDELIA VAZQUEZ     PROCEDURE DATE:  05/21/2023      INTERPRETATION:  CT LUMBAR SPINE WITHOUT CONTRAST    CLINICAL STATEMENT: back pain for several weeks.    TECHNIQUE: CT scan of the lumbar spine was performed without the   administration of intravenous iodinated contrast. Multiplanar and 3D   reformations were made.    COMPARISON: None available.    FINDINGS:  Lumbar alignment is maintained. No spondylolisthesis.  Vertebral body heights are preserved. No evidence of lumbar spine   fracture.  Small sclerotic focus in the L4 vertebral body, probably represents a   bone island.    Mild disc space narrowing and mild facet arthrosis at L4-L5 and L5-S1.    Bony bridging across the right sacroiliac joint.    IMPRESSION:  No CT evidence of lumbar spine fracture.    --- End of Report ---    CARMEN BECERRIL MD; Attending Radiologist  This document has been electronically signed. May 21 2023  8:15PM    < end of copied text >    ORT Score -   Family Hx of substance abuse	Female	      Male  Alcohol 	                                           1                     3  Illegal drugs	                                   2                     3  Rx drugs                                           4 	                  4  Personal Hx of substance abuse		  Alcohol 	                                          3	                  3  Illegal drugs                                     4	                  4  Rx drugs                                            5 	                  5  Age between 16- 45 years	           1                     1  hx preadolescent sexual abuse	   3 	                  0  Psychological disease		  ADD, OCD, bipolar, schizophrenia   2	          2  Depression                                           1 	          1  Total: 0    a score of 3 or lower indicates low risk for opioid abuse		  a score of 4-7 indicates moderate risk for opioid abuse		  a score of 8 or higher indicates high risk for opioid abuse  	    REVIEW OF SYSTEMS:  CONSTITUTIONAL: No fever or fatigue  HEENT:  No difficulty hearing, no change in vision  NECK: No pain or stiffness  RESPIRATORY: No cough, wheezing, chills or hemoptysis; No shortness of breath  CARDIOVASCULAR: No chest pain, palpitations, dizziness, or leg swelling  GASTROINTESTINAL: No loss of appetite, decreased PO intake. No abdominal or epigastric pain. No nausea, vomiting; No diarrhea or constipation.   GENITOURINARY: No dysuria, frequency, hematuria, retention or incontinence  MUSCULOSKELETAL: No joint pain or swelling; No muscle, back, or extremity pain, no upper or lower motor strength weakness, no saddle anesthesia, bowel/bladder incontinence, no falls   NEURO: No headaches, No numbness/tingling b/l LE, No weakness  ENDOCRINE: No polyuria, polydipsia, heat or cold intolerance; No hair loss  PSYCHIATRIC: No depression, anxiety or difficulty sleeping    PHYSICAL EXAM:  GENERAL:  Alert & Oriented X4, cooperative, NAD, Good concentration. Speech is clear.   RESPIRATORY: Respirations even and unlabored. Clear to auscultation bilaterally; No rales, rhonchi, wheezing, or rubs  CARDIOVASCULAR: Normal S1/S2, regular rate and rhythm; No murmurs, rubs, or gallops. No JVD.   GASTROINTESTINAL:  Soft, Nontender, Nondistended; Bowel sounds present  PERIPHERAL VASCULAR:  Extremities warm without edema. 2+ Peripheral Pulses, No cyanosis, No calf tenderness  MUSCULOSKELETAL: Motor Strength 5/5 B/L upper and lower extremities; moves all extremities equally against gravity; ROM intact; negative SLR; No tenderness on palpation of all joints.   SKIN: Warm, dry, intact. No rashes, lesions, scars or wounds.     Risk factors associated with adverse outcomes related to opioid treatment  [ ]  Concurrent benzodiazepine use  [ ]  History/ Active substance use or alcohol use disorder  [ ] Psychiatric co-morbidity  [ ] Sleep apnea  [ ] COPD  [ ] BMI> 35  [ ] Liver dysfunction  [ ] Renal dysfunction  [ ] CHF  [ ] Smoker  [ ]  Age > 60 years    [ ]  [facility]  Reviewed and Copied to Chart. See below.    Plan of care and goal oriented pain management treatment options were discussed with patient and /or primary care giver; all questions and concerns were addressed and care was aligned with patient's wishes.    Educated patient on goal oriented pain management treatment options        Source of information: QASIM KAUR, Chart review  Patient language: English  : n/a    HPI:  68year old male with pmhx of ex-cigarette smoker, prostate cancer, right knee meniscus tear, right foot bone spur and bilateral ankle and knee OA presents with c/o intermittent right knee pain x 29years that has failed to resolve with conservative management and 2 arthroscopic surgeries. Patient is here today for presurgical testing for scheduled Right Total Knee Replacement on 12/19/2023 (11 Dec 2023 13:45)      Patient is a 68y old  Male who presents with a chief complaint of Right Total Knee Replacement - Patients postoperative goals are better quality of life, improved mobility, improvements of activities of daily living and reduced knee pain. (11 Dec 2023 13:45)      Right total knee replacement.  S/p Right total knee replacement in 12/19/23     Pt is admitted for elective right total knee replacement.   Pt seen and examined at bedside. Reports pain score ***  SCALE USED: (1-10 VNRS). Pt describes pain as .... radiating to... alleviated by pain medication... exacerbated by movement... Pt tolerating PO diet. Denies lethargy, chest pain, SOB, nausea, vomiting, constipation. Reports last BM ***. Patient stated goal for pain control: to be able to take deep breaths, get out of bed to chair and ambulate with tolerable pain control. Pt denies taking medications for pain at home.     PAST MEDICAL & SURGICAL HISTORY:  Pinched nerve    OA (osteoarthritis)    Torn meniscus    OA (osteoarthritis) of ankle    Ex-cigarette smoker    H/O arthroscopy of knee    History of ankle surgery    History of foot surgery    FAMILY HISTORY:    Social History:   [ ] Denies ETOH use, illicit drug use and smoking    Allergies    sulfa drugs (Hives)    MEDICATIONS  (STANDING):  acetaminophen     Tablet .. 1000 milliGRAM(s) Oral every 6 hours  ceFAZolin   IVPB 2000 milliGRAM(s) IV Intermittent every 8 hours  chlorhexidine 2% Cloths 1 Application(s) Topical once  ferrous    sulfate 325 milliGRAM(s) Oral daily  pantoprazole    Tablet 40 milliGRAM(s) Oral before breakfast  polyethylene glycol 3350 17 Gram(s) Oral at bedtime  senna 2 Tablet(s) Oral at bedtime  sodium chloride 0.9% lock flush 3 milliLiter(s) IV Push every 8 hours  sodium chloride 0.9%. 1000 milliLiter(s) (110 mL/Hr) IV Continuous <Continuous>  traMADol 50 milliGRAM(s) Oral every 8 hours    MEDICATIONS  (PRN):  ketorolac   Injectable 15 milliGRAM(s) IV Push every 6 hours PRN Severe Pain (7 - 10)  magnesium hydroxide Suspension 30 milliLiter(s) Oral daily PRN Constipation  ondansetron Injectable 4 milliGRAM(s) IV Push every 6 hours PRN Nausea and/or Vomiting  oxyCODONE    IR 5 milliGRAM(s) Oral every 4 hours PRN Moderate Pain (4 - 6)      Vital Signs Last 24 Hrs  T(C): 36.3 (19 Dec 2023 16:23), Max: 36.8 (19 Dec 2023 09:45)  T(F): 97.3 (19 Dec 2023 16:23), Max: 98.2 (19 Dec 2023 09:45)  HR: 59 (19 Dec 2023 16:23) (51 - 80)  BP: 151/78 (19 Dec 2023 16:23) (111/81 - 151/78)  BP(mean): 99 (19 Dec 2023 16:04) (83 - 99)  RR: 18 (19 Dec 2023 16:23) (12 - 18)  SpO2: 96% (19 Dec 2023 16:23) (96% - 100%)    Parameters below as of 19 Dec 2023 16:23  Patient On (Oxygen Delivery Method): room air    LABS: Reviewed.                          9.5    5.84  )-----------( 284      ( 19 Dec 2023 15:20 )             31.6     12-19    140  |  106  |  15  ----------------------------<  84  4.0   |  29  |  0.96    Ca    9.0      19 Dec 2023 15:20    Urinalysis Basic - ( 19 Dec 2023 15:20 )    Color: x / Appearance: x / SG: x / pH: x  Gluc: 84 mg/dL / Ketone: x  / Bili: x / Urobili: x   Blood: x / Protein: x / Nitrite: x   Leuk Esterase: x / RBC: x / WBC x   Sq Epi: x / Non Sq Epi: x / Bacteria: x    CAPILLARY BLOOD GLUCOSE    POCT Blood Glucose.: 93 mg/dL (19 Dec 2023 15:22)      Radiology: Reviewed.   < from: Xray Knee 3 Views, Right (06.03.23 @ 15:25) >    ACC: [id number] EXAM:  XR KNEE AP LAT OBL 3 VIEWS RT   ORDERED BY: MARIBEL VICTORIA     PROCEDURE DATE:  06/03/2023      INTERPRETATION:  Right knee. 3 views. Patient has local pain.    There is a mildly effusion.    There are mild to early moderate degenerative findings. No fracture.    IMPRESSION: Mild effusion. Mild to moderate degeneration. No fracture.    --- End of Report ---    EVELYN CRISTINA MD; Attending Radiologist  This document has been electronically signed. Jun3 2023  3:42PM    < end of copied text >    < from: CT Lumbar Spine No Cont (05.21.23 @ 19:21) >  ACC: [id number] EXAM:  CT LUMBAR SPINE   ORDERED BY: GUDELIA VAZQUEZ     PROCEDURE DATE:  05/21/2023      INTERPRETATION:  CT LUMBAR SPINE WITHOUT CONTRAST    CLINICAL STATEMENT: back pain for several weeks.    TECHNIQUE: CT scan of the lumbar spine was performed without the   administration of intravenous iodinated contrast. Multiplanar and 3D   reformations were made.    COMPARISON: None available.    FINDINGS:  Lumbar alignment is maintained. No spondylolisthesis.  Vertebral body heights are preserved. No evidence of lumbar spine   fracture.  Small sclerotic focus in the L4 vertebral body, probably represents a   bone island.    Mild disc space narrowing and mild facet arthrosis at L4-L5 and L5-S1.    Bony bridging across the right sacroiliac joint.    IMPRESSION:  No CT evidence of lumbar spine fracture.    --- End of Report ---    CARMEN BECERRIL MD; Attending Radiologist  This document has been electronically signed. May 21 2023  8:15PM    < end of copied text >    ORT Score -   Family Hx of substance abuse	Female	      Male  Alcohol 	                                           1                     3  Illegal drugs	                                   2                     3  Rx drugs                                           4 	                  4  Personal Hx of substance abuse		  Alcohol 	                                          3	                  3  Illegal drugs                                     4	                  4  Rx drugs                                            5 	                  5  Age between 16- 45 years	           1                     1  hx preadolescent sexual abuse	   3 	                  0  Psychological disease		  ADD, OCD, bipolar, schizophrenia   2	          2  Depression                                           1 	          1  Total: 0    a score of 3 or lower indicates low risk for opioid abuse		  a score of 4-7 indicates moderate risk for opioid abuse		  a score of 8 or higher indicates high risk for opioid abuse  	    REVIEW OF SYSTEMS:  CONSTITUTIONAL: No fever or fatigue  HEENT:  No difficulty hearing, no change in vision  NECK: No pain or stiffness  RESPIRATORY: No cough, wheezing, chills or hemoptysis; No shortness of breath  CARDIOVASCULAR: No chest pain, palpitations, dizziness, or leg swelling  GASTROINTESTINAL: No loss of appetite, decreased PO intake. No abdominal or epigastric pain. No nausea, vomiting; No diarrhea or constipation.   GENITOURINARY: No dysuria, frequency, hematuria, retention or incontinence  MUSCULOSKELETAL: No joint pain or swelling; No muscle, back, or extremity pain, no upper or lower motor strength weakness, no saddle anesthesia, bowel/bladder incontinence, no falls   NEURO: No headaches, No numbness/tingling b/l LE, No weakness  ENDOCRINE: No polyuria, polydipsia, heat or cold intolerance; No hair loss  PSYCHIATRIC: No depression, anxiety or difficulty sleeping    PHYSICAL EXAM:  GENERAL:  Alert & Oriented X4, cooperative, NAD, Good concentration. Speech is clear.   RESPIRATORY: Respirations even and unlabored. Clear to auscultation bilaterally; No rales, rhonchi, wheezing, or rubs  CARDIOVASCULAR: Normal S1/S2, regular rate and rhythm; No murmurs, rubs, or gallops. No JVD.   GASTROINTESTINAL:  Soft, Nontender, Nondistended; Bowel sounds present  PERIPHERAL VASCULAR:  Extremities warm without edema. 2+ Peripheral Pulses, No cyanosis, No calf tenderness  MUSCULOSKELETAL: Motor Strength 5/5 B/L upper and lower extremities; moves all extremities equally against gravity; ROM intact; negative SLR; No tenderness on palpation of all joints.   SKIN: Warm, dry, intact. No rashes, lesions, scars or wounds.     Risk factors associated with adverse outcomes related to opioid treatment  [ ]  Concurrent benzodiazepine use  [ ]  History/ Active substance use or alcohol use disorder  [ ] Psychiatric co-morbidity  [ ] Sleep apnea  [ ] COPD  [ ] BMI> 35  [ ] Liver dysfunction  [ ] Renal dysfunction  [ ] CHF  [ ] Smoker  [ ]  Age > 60 years    [ ]  [facility]  Reviewed and Copied to Chart. See below.    Plan of care and goal oriented pain management treatment options were discussed with patient and /or primary care giver; all questions and concerns were addressed and care was aligned with patient's wishes.    Educated patient on goal oriented pain management treatment options        Source of information: QASIM KAUR, Chart review  Patient language: English  : n/a    HPI:  68year old male with pmhx of ex-cigarette smoker, prostate cancer, right knee meniscus tear, right foot bone spur and bilateral ankle and knee OA presents with c/o intermittent right knee pain x 29years that has failed to resolve with conservative management and 2 arthroscopic surgeries. Patient is here today for presurgical testing for scheduled Right Total Knee Replacement on 12/19/2023 (11 Dec 2023 13:45)      Patient is a 68y old  Male who presents with a chief complaint of Right Total Knee Replacement - Patients postoperative goals are better quality of life, improved mobility, improvements of activities of daily living and reduced knee pain. (11 Dec 2023 13:45)      Right total knee replacement.  S/p Right total knee replacement in 12/19/23     Pt is admitted for elective right total knee replacement.   Pt seen and examined at bedside. Reports pain score 8/10  SCALE USED: (1-10 VNRS). Pt describes pain as aching and squeezing and radiates to right thigh. Pain is alleviated by pain medication and cold application. Pain  exacerbated by movement. Right knee is wrapped in ace wrap, BLE's with +2 DP pulses.  Pt reported to nursing staff that he is hungry. Denies lethargy, chest pain, SOB, nausea, vomiting, constipation. Reports last BM 12/18. Patient stated goal for pain control: to be able to take deep breaths, get out of bed to chair and ambulate with tolerable pain control. Pt denies taking medications for pain at home. Encouraged patient to use IS 10 x hour with rationale provided.  Pt states that he has a rolling walker that was shipped to his home/      PAST MEDICAL & SURGICAL HISTORY:  Pinched nerve    OA (osteoarthritis)    Torn meniscus    OA (osteoarthritis) of ankle    Ex-cigarette smoker    H/O arthroscopy of knee    History of ankle surgery    History of foot surgery    FAMILY HISTORY:    Social History:   [X] Denies ETOH use, illicit drug use and smoking    Allergies    sulfa drugs (Hives)    MEDICATIONS  (STANDING):  acetaminophen     Tablet .. 1000 milliGRAM(s) Oral every 6 hours  ceFAZolin   IVPB 2000 milliGRAM(s) IV Intermittent every 8 hours  chlorhexidine 2% Cloths 1 Application(s) Topical once  ferrous    sulfate 325 milliGRAM(s) Oral daily  pantoprazole    Tablet 40 milliGRAM(s) Oral before breakfast  polyethylene glycol 3350 17 Gram(s) Oral at bedtime  senna 2 Tablet(s) Oral at bedtime  sodium chloride 0.9% lock flush 3 milliLiter(s) IV Push every 8 hours  sodium chloride 0.9%. 1000 milliLiter(s) (110 mL/Hr) IV Continuous <Continuous>  traMADol 50 milliGRAM(s) Oral every 8 hours    MEDICATIONS  (PRN):  ketorolac   Injectable 15 milliGRAM(s) IV Push every 6 hours PRN Severe Pain (7 - 10)  magnesium hydroxide Suspension 30 milliLiter(s) Oral daily PRN Constipation  ondansetron Injectable 4 milliGRAM(s) IV Push every 6 hours PRN Nausea and/or Vomiting  oxyCODONE    IR 5 milliGRAM(s) Oral every 4 hours PRN Moderate Pain (4 - 6)      Vital Signs Last 24 Hrs  T(C): 36.3 (19 Dec 2023 16:23), Max: 36.8 (19 Dec 2023 09:45)  T(F): 97.3 (19 Dec 2023 16:23), Max: 98.2 (19 Dec 2023 09:45)  HR: 59 (19 Dec 2023 16:23) (51 - 80)  BP: 151/78 (19 Dec 2023 16:23) (111/81 - 151/78)  BP(mean): 99 (19 Dec 2023 16:04) (83 - 99)  RR: 18 (19 Dec 2023 16:23) (12 - 18)  SpO2: 96% (19 Dec 2023 16:23) (96% - 100%)    Parameters below as of 19 Dec 2023 16:23  Patient On (Oxygen Delivery Method): room air    LABS: Reviewed.                          9.5    5.84  )-----------( 284      ( 19 Dec 2023 15:20 )             31.6     12-19    140  |  106  |  15  ----------------------------<  84  4.0   |  29  |  0.96    Ca    9.0      19 Dec 2023 15:20    Urinalysis Basic - ( 19 Dec 2023 15:20 )    Color: x / Appearance: x / SG: x / pH: x  Gluc: 84 mg/dL / Ketone: x  / Bili: x / Urobili: x   Blood: x / Protein: x / Nitrite: x   Leuk Esterase: x / RBC: x / WBC x   Sq Epi: x / Non Sq Epi: x / Bacteria: x    CAPILLARY BLOOD GLUCOSE    POCT Blood Glucose.: 93 mg/dL (19 Dec 2023 15:22)      Radiology: Reviewed.   < from: Xray Knee 3 Views, Right (06.03.23 @ 15:25) >    ACC: [id number] EXAM:  XR KNEE AP LAT OBL 3 VIEWS RT   ORDERED BY: MARIBEL VICTORIA     PROCEDURE DATE:  06/03/2023      INTERPRETATION:  Right knee. 3 views. Patient has local pain.    There is a mildly effusion.    There are mild to early moderate degenerative findings. No fracture.    IMPRESSION: Mild effusion. Mild to moderate degeneration. No fracture.    --- End of Report ---    EVELYN CRISTINA MD; Attending Radiologist  This document has been electronically signed. Jun3 2023  3:42PM    < end of copied text >    < from: CT Lumbar Spine No Cont (05.21.23 @ 19:21) >  ACC: [id number] EXAM:  CT LUMBAR SPINE   ORDERED BY: GUDELIA VAZQUEZ     PROCEDURE DATE:  05/21/2023      INTERPRETATION:  CT LUMBAR SPINE WITHOUT CONTRAST    CLINICAL STATEMENT: back pain for several weeks.    TECHNIQUE: CT scan of the lumbar spine was performed without the   administration of intravenous iodinated contrast. Multiplanar and 3D   reformations were made.    COMPARISON: None available.    FINDINGS:  Lumbar alignment is maintained. No spondylolisthesis.  Vertebral body heights are preserved. No evidence of lumbar spine   fracture.  Small sclerotic focus in the L4 vertebral body, probably represents a   bone island.    Mild disc space narrowing and mild facet arthrosis at L4-L5 and L5-S1.    Bony bridging across the right sacroiliac joint.    IMPRESSION:  No CT evidence of lumbar spine fracture.    --- End of Report ---    CARMEN BECERRIL MD; Attending Radiologist  This document has been electronically signed. May 21 2023  8:15PM    < end of copied text >    ORT Score -   Family Hx of substance abuse	Female	      Male  Alcohol 	                                           1                     3  Illegal drugs	                                   2                     3  Rx drugs                                           4 	                  4  Personal Hx of substance abuse		  Alcohol 	                                          3	                  3  Illegal drugs                                     4	                  4  Rx drugs                                            5 	                  5  Age between 16- 45 years	           1                     1  hx preadolescent sexual abuse	   3 	                  0  Psychological disease		  ADD, OCD, bipolar, schizophrenia   2	          2  Depression                                           1 	          1  Total: 0    a score of 3 or lower indicates low risk for opioid abuse		  a score of 4-7 indicates moderate risk for opioid abuse		  a score of 8 or higher indicates high risk for opioid abuse  	    REVIEW OF SYSTEMS:  CONSTITUTIONAL: No fever or fatigue  HEENT:  No difficulty hearing, no change in vision  NECK: No pain or stiffness  RESPIRATORY: No cough, wheezing, chills or hemoptysis; No shortness of breath  CARDIOVASCULAR: No chest pain, palpitations, dizziness, or leg swelling  GASTROINTESTINAL: No loss of appetite, decreased PO intake. No abdominal or epigastric pain. No nausea, vomiting; No diarrhea or constipation.   GENITOURINARY: No dysuria, frequency, hematuria, retention or incontinence  MUSCULOSKELETAL: + right knee pain, No muscle, back pain,  no upper or lower motor strength weakness, no saddle anesthesia, bowel/bladder incontinence, no falls,   NEURO: No headaches, No numbness/tingling b/l LE, No weakness  PSYCHIATRIC: No depression, anxiety or difficulty sleeping    PHYSICAL EXAM:  GENERAL:  Alert & Oriented X 4, cooperative, NAD, Good concentration. Speech is clear.   RESPIRATORY: Respirations even and unlabored. Clear to auscultation bilaterally; No rales, rhonchi, wheezing, or rubs  CARDIOVASCULAR: Normal S1/S2, regular rate and rhythm; No murmurs, rubs, or gallops. No JVD.   GASTROINTESTINAL:  Soft, Nontender, Nondistended; Bowel sounds present  PERIPHERAL VASCULAR:  Extremities warm without edema. 2+ Peripheral Pulses, No cyanosis, No calf tenderness  MUSCULOSKELETAL: Motor Strength 5/5 B/L upper and  left lower extremity;  ROM intact except RLE POD 0.   SKIN: Warm, dry, intact. No rashes, lesions, R knee wrapped in ace wrap    Risk factors associated with adverse outcomes related to opioid treatment  [ ]  Concurrent benzodiazepine use  [ ]  History/ Active substance use or alcohol use disorder  [ ] Psychiatric co-morbidity  [ ] Sleep apnea  [ ] COPD  [ ] BMI> 35  [ ] Liver dysfunction  [ ] Renal dysfunction  [ ] CHF  [ ] Smoker  [ ]  Age > 60 years    [X]  [place]  Reviewed and Copied to Chart. See below.    Plan of care and goal oriented pain management treatment options were discussed with patient and /or primary care giver; all questions and concerns were addressed and care was aligned with patient's wishes.    Educated patient on goal oriented pain management treatment options        Source of information: QASIM KAUR, Chart review  Patient language: English  : n/a    HPI:  68year old male with pmhx of ex-cigarette smoker, prostate cancer, right knee meniscus tear, right foot bone spur and bilateral ankle and knee OA presents with c/o intermittent right knee pain x 29years that has failed to resolve with conservative management and 2 arthroscopic surgeries. Patient is here today for presurgical testing for scheduled Right Total Knee Replacement on 12/19/2023 (11 Dec 2023 13:45)      Patient is a 68y old  Male who presents with a chief complaint of Right Total Knee Replacement - Patients postoperative goals are better quality of life, improved mobility, improvements of activities of daily living and reduced knee pain. (11 Dec 2023 13:45)      Right total knee replacement.  S/p Right total knee replacement in 12/19/23     Pt is admitted for elective right total knee replacement.   Pt seen and examined at bedside. Reports pain score 8/10  SCALE USED: (1-10 VNRS). Pt describes pain as aching and squeezing and radiates to right thigh. Pain is alleviated by pain medication and cold application. Pain  exacerbated by movement. Right knee is wrapped in ace wrap, BLE's with +2 DP pulses.  Pt reported to nursing staff that he is hungry. Denies lethargy, chest pain, SOB, nausea, vomiting, constipation. Reports last BM 12/18. Patient stated goal for pain control: to be able to take deep breaths, get out of bed to chair and ambulate with tolerable pain control. Pt denies taking medications for pain at home. Encouraged patient to use IS 10 x hour with rationale provided.  Pt states that he has a rolling walker that was shipped to his home/      PAST MEDICAL & SURGICAL HISTORY:  Pinched nerve    OA (osteoarthritis)    Torn meniscus    OA (osteoarthritis) of ankle    Ex-cigarette smoker    H/O arthroscopy of knee    History of ankle surgery    History of foot surgery    FAMILY HISTORY:    Social History:   [X] Denies ETOH use, illicit drug use and smoking    Allergies    sulfa drugs (Hives)    MEDICATIONS  (STANDING):  acetaminophen     Tablet .. 1000 milliGRAM(s) Oral every 6 hours  ceFAZolin   IVPB 2000 milliGRAM(s) IV Intermittent every 8 hours  chlorhexidine 2% Cloths 1 Application(s) Topical once  ferrous    sulfate 325 milliGRAM(s) Oral daily  pantoprazole    Tablet 40 milliGRAM(s) Oral before breakfast  polyethylene glycol 3350 17 Gram(s) Oral at bedtime  senna 2 Tablet(s) Oral at bedtime  sodium chloride 0.9% lock flush 3 milliLiter(s) IV Push every 8 hours  sodium chloride 0.9%. 1000 milliLiter(s) (110 mL/Hr) IV Continuous <Continuous>  traMADol 50 milliGRAM(s) Oral every 8 hours    MEDICATIONS  (PRN):  ketorolac   Injectable 15 milliGRAM(s) IV Push every 6 hours PRN Severe Pain (7 - 10)  magnesium hydroxide Suspension 30 milliLiter(s) Oral daily PRN Constipation  ondansetron Injectable 4 milliGRAM(s) IV Push every 6 hours PRN Nausea and/or Vomiting  oxyCODONE    IR 5 milliGRAM(s) Oral every 4 hours PRN Moderate Pain (4 - 6)      Vital Signs Last 24 Hrs  T(C): 36.3 (19 Dec 2023 16:23), Max: 36.8 (19 Dec 2023 09:45)  T(F): 97.3 (19 Dec 2023 16:23), Max: 98.2 (19 Dec 2023 09:45)  HR: 59 (19 Dec 2023 16:23) (51 - 80)  BP: 151/78 (19 Dec 2023 16:23) (111/81 - 151/78)  BP(mean): 99 (19 Dec 2023 16:04) (83 - 99)  RR: 18 (19 Dec 2023 16:23) (12 - 18)  SpO2: 96% (19 Dec 2023 16:23) (96% - 100%)    Parameters below as of 19 Dec 2023 16:23  Patient On (Oxygen Delivery Method): room air    LABS: Reviewed.                          9.5    5.84  )-----------( 284      ( 19 Dec 2023 15:20 )             31.6     12-19    140  |  106  |  15  ----------------------------<  84  4.0   |  29  |  0.96    Ca    9.0      19 Dec 2023 15:20    Urinalysis Basic - ( 19 Dec 2023 15:20 )    Color: x / Appearance: x / SG: x / pH: x  Gluc: 84 mg/dL / Ketone: x  / Bili: x / Urobili: x   Blood: x / Protein: x / Nitrite: x   Leuk Esterase: x / RBC: x / WBC x   Sq Epi: x / Non Sq Epi: x / Bacteria: x    CAPILLARY BLOOD GLUCOSE    POCT Blood Glucose.: 93 mg/dL (19 Dec 2023 15:22)      Radiology: Reviewed.   < from: Xray Knee 3 Views, Right (06.03.23 @ 15:25) >    ACC: [id number] EXAM:  XR KNEE AP LAT OBL 3 VIEWS RT   ORDERED BY: MARIBEL VICTORIA     PROCEDURE DATE:  06/03/2023      INTERPRETATION:  Right knee. 3 views. Patient has local pain.    There is a mildly effusion.    There are mild to early moderate degenerative findings. No fracture.    IMPRESSION: Mild effusion. Mild to moderate degeneration. No fracture.    --- End of Report ---    EVELYN CRISTINA MD; Attending Radiologist  This document has been electronically signed. Jun3 2023  3:42PM    < end of copied text >    < from: CT Lumbar Spine No Cont (05.21.23 @ 19:21) >  ACC: [id number] EXAM:  CT LUMBAR SPINE   ORDERED BY: GUDELIA VAZQUEZ     PROCEDURE DATE:  05/21/2023      INTERPRETATION:  CT LUMBAR SPINE WITHOUT CONTRAST    CLINICAL STATEMENT: back pain for several weeks.    TECHNIQUE: CT scan of the lumbar spine was performed without the   administration of intravenous iodinated contrast. Multiplanar and 3D   reformations were made.    COMPARISON: None available.    FINDINGS:  Lumbar alignment is maintained. No spondylolisthesis.  Vertebral body heights are preserved. No evidence of lumbar spine   fracture.  Small sclerotic focus in the L4 vertebral body, probably represents a   bone island.    Mild disc space narrowing and mild facet arthrosis at L4-L5 and L5-S1.    Bony bridging across the right sacroiliac joint.    IMPRESSION:  No CT evidence of lumbar spine fracture.    --- End of Report ---    CARMEN BECERRIL MD; Attending Radiologist  This document has been electronically signed. May 21 2023  8:15PM    < end of copied text >    ORT Score -   Family Hx of substance abuse	Female	      Male  Alcohol 	                                           1                     3  Illegal drugs	                                   2                     3  Rx drugs                                           4 	                  4  Personal Hx of substance abuse		  Alcohol 	                                          3	                  3  Illegal drugs                                     4	                  4  Rx drugs                                            5 	                  5  Age between 16- 45 years	           1                     1  hx preadolescent sexual abuse	   3 	                  0  Psychological disease		  ADD, OCD, bipolar, schizophrenia   2	          2  Depression                                           1 	          1  Total: 0    a score of 3 or lower indicates low risk for opioid abuse		  a score of 4-7 indicates moderate risk for opioid abuse		  a score of 8 or higher indicates high risk for opioid abuse  	    REVIEW OF SYSTEMS:  CONSTITUTIONAL: No fever or fatigue  HEENT:  No difficulty hearing, no change in vision  NECK: No pain or stiffness  RESPIRATORY: No cough, wheezing, chills or hemoptysis; No shortness of breath  CARDIOVASCULAR: No chest pain, palpitations, dizziness, or leg swelling  GASTROINTESTINAL: No loss of appetite, decreased PO intake. No abdominal or epigastric pain. No nausea, vomiting; No diarrhea or constipation.   GENITOURINARY: No dysuria, frequency, hematuria, retention or incontinence  MUSCULOSKELETAL: + right knee pain, No muscle, back pain,  no upper or lower motor strength weakness, no saddle anesthesia, bowel/bladder incontinence, no falls,   NEURO: No headaches, No numbness/tingling b/l LE, No weakness  PSYCHIATRIC: No depression, anxiety or difficulty sleeping    PHYSICAL EXAM:  GENERAL:  Alert & Oriented X 4, cooperative, NAD, Good concentration. Speech is clear.   RESPIRATORY: Respirations even and unlabored. Clear to auscultation bilaterally; No rales, rhonchi, wheezing, or rubs  CARDIOVASCULAR: Normal S1/S2, regular rate and rhythm; No murmurs, rubs, or gallops. No JVD.   GASTROINTESTINAL:  Soft, Nontender, Nondistended; Bowel sounds present  PERIPHERAL VASCULAR:  Extremities warm without edema. 2+ Peripheral Pulses, No cyanosis, No calf tenderness  MUSCULOSKELETAL: Motor Strength 5/5 B/L upper and  left lower extremity;  ROM intact except RLE POD 0.   SKIN: Warm, dry, intact. No rashes, lesions, R knee wrapped in ace wrap    Risk factors associated with adverse outcomes related to opioid treatment  [ ]  Concurrent benzodiazepine use  [ ]  History/ Active substance use or alcohol use disorder  [ ] Psychiatric co-morbidity  [ ] Sleep apnea  [ ] COPD  [ ] BMI> 35  [ ] Liver dysfunction  [ ] Renal dysfunction  [ ] CHF  [ ] Smoker  [ ]  Age > 60 years    [X]  [place]  Reviewed and Copied to Chart. See below.    Plan of care and goal oriented pain management treatment options were discussed with patient and /or primary care giver; all questions and concerns were addressed and care was aligned with patient's wishes.    Educated patient on goal oriented pain management treatment options

## 2023-12-19 NOTE — PATIENT PROFILE ADULT - FALL HARM RISK - UNIVERSAL INTERVENTIONS
Bed in lowest position, wheels locked, appropriate side rails in place/Call bell, personal items and telephone in reach/Instruct patient to call for assistance before getting out of bed or chair/Non-slip footwear when patient is out of bed/[place] to call system/Physically safe environment - no spills, clutter or unnecessary equipment/Purposeful Proactive Rounding/Room/bathroom lighting operational, light cord in reach Bed in lowest position, wheels locked, appropriate side rails in place/Call bell, personal items and telephone in reach/Instruct patient to call for assistance before getting out of bed or chair/Non-slip footwear when patient is out of bed/[place] to call system/Physically safe environment - no spills, clutter or unnecessary equipment/Purposeful Proactive Rounding/Room/bathroom lighting operational, light cord in reach

## 2023-12-20 LAB
ALBUMIN SERPL ELPH-MCNC: 2.4 G/DL — LOW (ref 3.5–5)
ALBUMIN SERPL ELPH-MCNC: 2.4 G/DL — LOW (ref 3.5–5)
ALP SERPL-CCNC: 92 U/L — SIGNIFICANT CHANGE UP (ref 40–120)
ALP SERPL-CCNC: 92 U/L — SIGNIFICANT CHANGE UP (ref 40–120)
ALT FLD-CCNC: 15 U/L DA — SIGNIFICANT CHANGE UP (ref 10–60)
ALT FLD-CCNC: 15 U/L DA — SIGNIFICANT CHANGE UP (ref 10–60)
ANION GAP SERPL CALC-SCNC: 5 MMOL/L — SIGNIFICANT CHANGE UP (ref 5–17)
ANION GAP SERPL CALC-SCNC: 5 MMOL/L — SIGNIFICANT CHANGE UP (ref 5–17)
ANION GAP SERPL CALC-SCNC: 7 MMOL/L — SIGNIFICANT CHANGE UP (ref 5–17)
ANION GAP SERPL CALC-SCNC: 7 MMOL/L — SIGNIFICANT CHANGE UP (ref 5–17)
AST SERPL-CCNC: 12 U/L — SIGNIFICANT CHANGE UP (ref 10–40)
AST SERPL-CCNC: 12 U/L — SIGNIFICANT CHANGE UP (ref 10–40)
BILIRUB SERPL-MCNC: 0.5 MG/DL — SIGNIFICANT CHANGE UP (ref 0.2–1.2)
BILIRUB SERPL-MCNC: 0.5 MG/DL — SIGNIFICANT CHANGE UP (ref 0.2–1.2)
BUN SERPL-MCNC: 13 MG/DL — SIGNIFICANT CHANGE UP (ref 7–18)
CALCIUM SERPL-MCNC: 8.3 MG/DL — LOW (ref 8.4–10.5)
CALCIUM SERPL-MCNC: 8.3 MG/DL — LOW (ref 8.4–10.5)
CALCIUM SERPL-MCNC: 9.5 MG/DL — SIGNIFICANT CHANGE UP (ref 8.4–10.5)
CALCIUM SERPL-MCNC: 9.5 MG/DL — SIGNIFICANT CHANGE UP (ref 8.4–10.5)
CHLORIDE SERPL-SCNC: 102 MMOL/L — SIGNIFICANT CHANGE UP (ref 96–108)
CHLORIDE SERPL-SCNC: 102 MMOL/L — SIGNIFICANT CHANGE UP (ref 96–108)
CHLORIDE SERPL-SCNC: 105 MMOL/L — SIGNIFICANT CHANGE UP (ref 96–108)
CHLORIDE SERPL-SCNC: 105 MMOL/L — SIGNIFICANT CHANGE UP (ref 96–108)
CK MB BLD-MCNC: <0.6 % — SIGNIFICANT CHANGE UP (ref 0–3.5)
CK MB BLD-MCNC: <0.6 % — SIGNIFICANT CHANGE UP (ref 0–3.5)
CK MB CFR SERPL CALC: <1 NG/ML — SIGNIFICANT CHANGE UP (ref 0–3.6)
CK MB CFR SERPL CALC: <1 NG/ML — SIGNIFICANT CHANGE UP (ref 0–3.6)
CK SERPL-CCNC: 180 U/L — SIGNIFICANT CHANGE UP (ref 35–232)
CK SERPL-CCNC: 180 U/L — SIGNIFICANT CHANGE UP (ref 35–232)
CO2 SERPL-SCNC: 27 MMOL/L — SIGNIFICANT CHANGE UP (ref 22–31)
CO2 SERPL-SCNC: 27 MMOL/L — SIGNIFICANT CHANGE UP (ref 22–31)
CO2 SERPL-SCNC: 29 MMOL/L — SIGNIFICANT CHANGE UP (ref 22–31)
CO2 SERPL-SCNC: 29 MMOL/L — SIGNIFICANT CHANGE UP (ref 22–31)
CREAT SERPL-MCNC: 0.94 MG/DL — SIGNIFICANT CHANGE UP (ref 0.5–1.3)
CREAT SERPL-MCNC: 0.94 MG/DL — SIGNIFICANT CHANGE UP (ref 0.5–1.3)
CREAT SERPL-MCNC: 1.15 MG/DL — SIGNIFICANT CHANGE UP (ref 0.5–1.3)
CREAT SERPL-MCNC: 1.15 MG/DL — SIGNIFICANT CHANGE UP (ref 0.5–1.3)
EGFR: 69 ML/MIN/1.73M2 — SIGNIFICANT CHANGE UP
EGFR: 69 ML/MIN/1.73M2 — SIGNIFICANT CHANGE UP
EGFR: 88 ML/MIN/1.73M2 — SIGNIFICANT CHANGE UP
EGFR: 88 ML/MIN/1.73M2 — SIGNIFICANT CHANGE UP
GLUCOSE BLDC GLUCOMTR-MCNC: 171 MG/DL — HIGH (ref 70–99)
GLUCOSE BLDC GLUCOMTR-MCNC: 171 MG/DL — HIGH (ref 70–99)
GLUCOSE SERPL-MCNC: 113 MG/DL — HIGH (ref 70–99)
GLUCOSE SERPL-MCNC: 113 MG/DL — HIGH (ref 70–99)
GLUCOSE SERPL-MCNC: 144 MG/DL — HIGH (ref 70–99)
GLUCOSE SERPL-MCNC: 144 MG/DL — HIGH (ref 70–99)
HCT VFR BLD CALC: 33.2 % — LOW (ref 39–50)
HCT VFR BLD CALC: 33.2 % — LOW (ref 39–50)
HCT VFR BLD CALC: 34.4 % — LOW (ref 39–50)
HCT VFR BLD CALC: 34.4 % — LOW (ref 39–50)
HGB BLD-MCNC: 10.1 G/DL — LOW (ref 13–17)
HGB BLD-MCNC: 10.1 G/DL — LOW (ref 13–17)
HGB BLD-MCNC: 9.8 G/DL — LOW (ref 13–17)
HGB BLD-MCNC: 9.8 G/DL — LOW (ref 13–17)
LACTATE SERPL-SCNC: 1.2 MMOL/L — SIGNIFICANT CHANGE UP (ref 0.7–2)
LACTATE SERPL-SCNC: 1.2 MMOL/L — SIGNIFICANT CHANGE UP (ref 0.7–2)
LACTATE SERPL-SCNC: 2.6 MMOL/L — HIGH (ref 0.7–2)
LACTATE SERPL-SCNC: 2.6 MMOL/L — HIGH (ref 0.7–2)
MAGNESIUM SERPL-MCNC: 1.9 MG/DL — SIGNIFICANT CHANGE UP (ref 1.6–2.6)
MAGNESIUM SERPL-MCNC: 1.9 MG/DL — SIGNIFICANT CHANGE UP (ref 1.6–2.6)
MCHC RBC-ENTMCNC: 23.9 PG — LOW (ref 27–34)
MCHC RBC-ENTMCNC: 23.9 PG — LOW (ref 27–34)
MCHC RBC-ENTMCNC: 24 PG — LOW (ref 27–34)
MCHC RBC-ENTMCNC: 24 PG — LOW (ref 27–34)
MCHC RBC-ENTMCNC: 29.4 GM/DL — LOW (ref 32–36)
MCHC RBC-ENTMCNC: 29.4 GM/DL — LOW (ref 32–36)
MCHC RBC-ENTMCNC: 29.5 GM/DL — LOW (ref 32–36)
MCHC RBC-ENTMCNC: 29.5 GM/DL — LOW (ref 32–36)
MCV RBC AUTO: 81.2 FL — SIGNIFICANT CHANGE UP (ref 80–100)
MCV RBC AUTO: 81.2 FL — SIGNIFICANT CHANGE UP (ref 80–100)
MCV RBC AUTO: 81.3 FL — SIGNIFICANT CHANGE UP (ref 80–100)
MCV RBC AUTO: 81.3 FL — SIGNIFICANT CHANGE UP (ref 80–100)
NRBC # BLD: 0 /100 WBCS — SIGNIFICANT CHANGE UP (ref 0–0)
PHOSPHATE SERPL-MCNC: 2.9 MG/DL — SIGNIFICANT CHANGE UP (ref 2.5–4.5)
PHOSPHATE SERPL-MCNC: 2.9 MG/DL — SIGNIFICANT CHANGE UP (ref 2.5–4.5)
PLATELET # BLD AUTO: 318 K/UL — SIGNIFICANT CHANGE UP (ref 150–400)
PLATELET # BLD AUTO: 318 K/UL — SIGNIFICANT CHANGE UP (ref 150–400)
PLATELET # BLD AUTO: 381 K/UL — SIGNIFICANT CHANGE UP (ref 150–400)
PLATELET # BLD AUTO: 381 K/UL — SIGNIFICANT CHANGE UP (ref 150–400)
POTASSIUM SERPL-MCNC: 4.1 MMOL/L — SIGNIFICANT CHANGE UP (ref 3.5–5.3)
POTASSIUM SERPL-MCNC: 4.1 MMOL/L — SIGNIFICANT CHANGE UP (ref 3.5–5.3)
POTASSIUM SERPL-MCNC: 4.7 MMOL/L — SIGNIFICANT CHANGE UP (ref 3.5–5.3)
POTASSIUM SERPL-MCNC: 4.7 MMOL/L — SIGNIFICANT CHANGE UP (ref 3.5–5.3)
POTASSIUM SERPL-SCNC: 4.1 MMOL/L — SIGNIFICANT CHANGE UP (ref 3.5–5.3)
POTASSIUM SERPL-SCNC: 4.1 MMOL/L — SIGNIFICANT CHANGE UP (ref 3.5–5.3)
POTASSIUM SERPL-SCNC: 4.7 MMOL/L — SIGNIFICANT CHANGE UP (ref 3.5–5.3)
POTASSIUM SERPL-SCNC: 4.7 MMOL/L — SIGNIFICANT CHANGE UP (ref 3.5–5.3)
PROT SERPL-MCNC: 7.3 G/DL — SIGNIFICANT CHANGE UP (ref 6–8.3)
PROT SERPL-MCNC: 7.3 G/DL — SIGNIFICANT CHANGE UP (ref 6–8.3)
RBC # BLD: 4.09 M/UL — LOW (ref 4.2–5.8)
RBC # BLD: 4.09 M/UL — LOW (ref 4.2–5.8)
RBC # BLD: 4.23 M/UL — SIGNIFICANT CHANGE UP (ref 4.2–5.8)
RBC # BLD: 4.23 M/UL — SIGNIFICANT CHANGE UP (ref 4.2–5.8)
RBC # FLD: 14.5 % — SIGNIFICANT CHANGE UP (ref 10.3–14.5)
SODIUM SERPL-SCNC: 136 MMOL/L — SIGNIFICANT CHANGE UP (ref 135–145)
SODIUM SERPL-SCNC: 136 MMOL/L — SIGNIFICANT CHANGE UP (ref 135–145)
SODIUM SERPL-SCNC: 139 MMOL/L — SIGNIFICANT CHANGE UP (ref 135–145)
SODIUM SERPL-SCNC: 139 MMOL/L — SIGNIFICANT CHANGE UP (ref 135–145)
TROPONIN I, HIGH SENSITIVITY RESULT: 5.8 NG/L — SIGNIFICANT CHANGE UP
TROPONIN I, HIGH SENSITIVITY RESULT: 5.8 NG/L — SIGNIFICANT CHANGE UP
WBC # BLD: 11.74 K/UL — HIGH (ref 3.8–10.5)
WBC # BLD: 11.74 K/UL — HIGH (ref 3.8–10.5)
WBC # BLD: 6.97 K/UL — SIGNIFICANT CHANGE UP (ref 3.8–10.5)
WBC # BLD: 6.97 K/UL — SIGNIFICANT CHANGE UP (ref 3.8–10.5)
WBC # FLD AUTO: 11.74 K/UL — HIGH (ref 3.8–10.5)
WBC # FLD AUTO: 11.74 K/UL — HIGH (ref 3.8–10.5)
WBC # FLD AUTO: 6.97 K/UL — SIGNIFICANT CHANGE UP (ref 3.8–10.5)
WBC # FLD AUTO: 6.97 K/UL — SIGNIFICANT CHANGE UP (ref 3.8–10.5)

## 2023-12-20 PROCEDURE — 99232 SBSQ HOSP IP/OBS MODERATE 35: CPT

## 2023-12-20 RX ORDER — SODIUM CHLORIDE 9 MG/ML
1000 INJECTION INTRAMUSCULAR; INTRAVENOUS; SUBCUTANEOUS ONCE
Refills: 0 | Status: COMPLETED | OUTPATIENT
Start: 2023-12-20 | End: 2023-12-20

## 2023-12-20 RX ORDER — SODIUM CHLORIDE 9 MG/ML
1000 INJECTION, SOLUTION INTRAVENOUS ONCE
Refills: 0 | Status: COMPLETED | OUTPATIENT
Start: 2023-12-20 | End: 2023-12-20

## 2023-12-20 RX ORDER — ACETAMINOPHEN 500 MG
1000 TABLET ORAL ONCE
Refills: 0 | Status: COMPLETED | OUTPATIENT
Start: 2023-12-20 | End: 2023-12-20

## 2023-12-20 RX ADMIN — Medication 1000 MILLIGRAM(S): at 05:40

## 2023-12-20 RX ADMIN — Medication 325 MILLIGRAM(S): at 13:00

## 2023-12-20 RX ADMIN — Medication 100 MILLIGRAM(S): at 05:40

## 2023-12-20 RX ADMIN — SODIUM CHLORIDE 110 MILLILITER(S): 9 INJECTION INTRAMUSCULAR; INTRAVENOUS; SUBCUTANEOUS at 12:59

## 2023-12-20 RX ADMIN — Medication 15 MILLIGRAM(S): at 14:35

## 2023-12-20 RX ADMIN — SODIUM CHLORIDE 2000 MILLILITER(S): 9 INJECTION INTRAMUSCULAR; INTRAVENOUS; SUBCUTANEOUS at 13:09

## 2023-12-20 RX ADMIN — Medication 1000 MILLIGRAM(S): at 14:35

## 2023-12-20 RX ADMIN — TRAMADOL HYDROCHLORIDE 50 MILLIGRAM(S): 50 TABLET ORAL at 05:40

## 2023-12-20 RX ADMIN — SODIUM CHLORIDE 3 MILLILITER(S): 9 INJECTION INTRAMUSCULAR; INTRAVENOUS; SUBCUTANEOUS at 05:46

## 2023-12-20 RX ADMIN — OXYCODONE HYDROCHLORIDE 5 MILLIGRAM(S): 5 TABLET ORAL at 02:28

## 2023-12-20 RX ADMIN — TRAMADOL HYDROCHLORIDE 50 MILLIGRAM(S): 50 TABLET ORAL at 13:00

## 2023-12-20 RX ADMIN — Medication 400 MILLIGRAM(S): at 22:12

## 2023-12-20 RX ADMIN — Medication 15 MILLIGRAM(S): at 19:41

## 2023-12-20 RX ADMIN — Medication 15 MILLIGRAM(S): at 10:50

## 2023-12-20 RX ADMIN — Medication 1000 MILLIGRAM(S): at 12:59

## 2023-12-20 RX ADMIN — ENOXAPARIN SODIUM 30 MILLIGRAM(S): 100 INJECTION SUBCUTANEOUS at 13:00

## 2023-12-20 RX ADMIN — TRAMADOL HYDROCHLORIDE 50 MILLIGRAM(S): 50 TABLET ORAL at 06:30

## 2023-12-20 RX ADMIN — TRAMADOL HYDROCHLORIDE 50 MILLIGRAM(S): 50 TABLET ORAL at 14:35

## 2023-12-20 RX ADMIN — Medication 1000 MILLIGRAM(S): at 22:12

## 2023-12-20 RX ADMIN — SODIUM CHLORIDE 3 MILLILITER(S): 9 INJECTION INTRAMUSCULAR; INTRAVENOUS; SUBCUTANEOUS at 22:38

## 2023-12-20 RX ADMIN — Medication 15 MILLIGRAM(S): at 20:05

## 2023-12-20 RX ADMIN — ENOXAPARIN SODIUM 30 MILLIGRAM(S): 100 INJECTION SUBCUTANEOUS at 01:25

## 2023-12-20 RX ADMIN — PANTOPRAZOLE SODIUM 40 MILLIGRAM(S): 20 TABLET, DELAYED RELEASE ORAL at 06:13

## 2023-12-20 RX ADMIN — OXYCODONE HYDROCHLORIDE 5 MILLIGRAM(S): 5 TABLET ORAL at 03:30

## 2023-12-20 RX ADMIN — SODIUM CHLORIDE 1000 MILLILITER(S): 9 INJECTION, SOLUTION INTRAVENOUS at 14:42

## 2023-12-20 NOTE — PROGRESS NOTE ADULT - SUBJECTIVE AND OBJECTIVE BOX
Source of information: QASIM KAUR, Chart review  Patient language: English  : n/a    HPI:  68year old male with pmhx of ex-cigarette smoker, prostate cancer, right knee meniscus tear, right foot bone spur and bilateral ankle and knee OA presents with c/o intermittent right knee pain x 29years that has failed to resolve with conservative management and 2 arthroscopic surgeries. Patient is here today for presurgical testing for scheduled Right Total Knee Replacement on 12/19/2023 (11 Dec 2023 13:45)    Pt is admitted for elective right total knee replacement. Pt is S/p Right total knee replacement on 12/19/23. POD#1. Pt seen and examined at bedside this mrsaleem. Pt found lying in bed, awake, alert and oriented x 3, speech clear, follows commands. Pt observed comfortably, no acute distress noted. Reports pain score 5/10 at present time and tolerable. Pt stating was recently medicated with pain medicated by RN. SCALE USED: (1-10 VNRS). Pt describes pain as aching and throbbing and radiates to right thigh. Pain is alleviated by pain medication and ice application. Pain exacerbated by movement and palpation. Right knee wrapped with ace wrap, BLE's with +2 DP pulses. Pt denies lethargy, chest pain, SOB, nausea, vomiting, constipation. Reports last BM 12/18. Patient stated goal for pain control: to be able to take deep breaths, get out of bed to chair and ambulate with tolerable pain control. Pt reports taking Tylenol and Naproxen for pain at home. Pt states that he has a rolling walker that was shipped to his home. Pt scheduled to do PT this morning, hasn't been OOB yet. Pt encouraged to use pain medications as order for pain control.     PAST MEDICAL & SURGICAL HISTORY:  Pinched nerve    OA (osteoarthritis)    Torn meniscus    OA (osteoarthritis) of ankle    Ex-cigarette smoker    H/O arthroscopy of knee    History of ankle surgery    History of foot surgery    FAMILY HISTORY:    Social History:   [x]Denies ETOH use, illicit drug use, and smoking     Allergies    sulfa drugs (Hives)    Intolerances    MEDICATIONS  (STANDING):  chlorhexidine 2% Cloths 1 Application(s) Topical once  enoxaparin Injectable 30 milliGRAM(s) SubCutaneous every 12 hours  ferrous    sulfate 325 milliGRAM(s) Oral daily  pantoprazole    Tablet 40 milliGRAM(s) Oral before breakfast  polyethylene glycol 3350 17 Gram(s) Oral at bedtime  senna 2 Tablet(s) Oral at bedtime  sodium chloride 0.9% lock flush 3 milliLiter(s) IV Push every 8 hours  sodium chloride 0.9%. 1000 milliLiter(s) (110 mL/Hr) IV Continuous <Continuous>  traMADol 50 milliGRAM(s) Oral every 8 hours    MEDICATIONS  (PRN):  ketorolac   Injectable 15 milliGRAM(s) IV Push every 6 hours PRN Severe Pain (7 - 10)  magnesium hydroxide Suspension 30 milliLiter(s) Oral daily PRN Constipation  ondansetron Injectable 4 milliGRAM(s) IV Push every 6 hours PRN Nausea and/or Vomiting  oxyCODONE    IR 5 milliGRAM(s) Oral every 4 hours PRN Moderate Pain (4 - 6)    Vital Signs Last 24 Hrs  T(C): 37.3 (20 Dec 2023 05:25), Max: 37.3 (19 Dec 2023 21:51)  T(F): 99.2 (20 Dec 2023 05:25), Max: 99.2 (20 Dec 2023 05:25)  HR: 86 (20 Dec 2023 12:52) (51 - 86)  BP: 162/95 (20 Dec 2023 12:52) (111/81 - 162/95)  BP(mean): 117 (20 Dec 2023 12:52) (83 - 117)  RR: 18 (20 Dec 2023 12:52) (12 - 18)  SpO2: 96% (20 Dec 2023 12:52) (96% - 100%)    Parameters below as of 20 Dec 2023 12:52  Patient On (Oxygen Delivery Method): room air    LABS: Reviewed                        9.8    11.74 )-----------( 381      ( 20 Dec 2023 12:32 )             33.2     12-20    136  |  102  |  13  ----------------------------<  144<H>  4.1   |  27  |  1.15    Ca    8.3<L>      20 Dec 2023 12:32  Phos  2.9     12-20  Mg     1.9     12-20    TPro  7.3  /  Alb  2.4<L>  /  TBili  0.5  /  DBili  x   /  AST  12  /  ALT  15  /  AlkPhos  92  12-20      LIVER FUNCTIONS - ( 20 Dec 2023 12:32 )  Alb: 2.4 g/dL / Pro: 7.3 g/dL / ALK PHOS: 92 U/L / ALT: 15 U/L DA / AST: 12 U/L / GGT: x           Urinalysis Basic - ( 20 Dec 2023 12:32 )    Color: x / Appearance: x / SG: x / pH: x  Gluc: 144 mg/dL / Ketone: x  / Bili: x / Urobili: x   Blood: x / Protein: x / Nitrite: x   Leuk Esterase: x / RBC: x / WBC x   Sq Epi: x / Non Sq Epi: x / Bacteria: x    CAPILLARY BLOOD GLUCOSE    POCT Blood Glucose.: 171 mg/dL (20 Dec 2023 12:16)  POCT Blood Glucose.: 93 mg/dL (19 Dec 2023 15:22)    Radiology: none on this admission.    ORT Score -   Family Hx of substance abuse	Female	      Male  Alcohol 	                                           1                     3  Illegal drugs	                                   2                     3  Rx drugs                                           4 	                  4  Personal Hx of substance abuse		  Alcohol 	                                          3	                  3  Illegal drugs                                     4	                  4  Rx drugs                                            5 	                  5  Age between 16- 45 years	           1                     1  hx preadolescent sexual abuse	   3 	                  0  Psychological disease		  ADD, OCD, bipolar, schizophrenia   2	          2  Depression                                           1 	          1  Total: 0    a score of 3 or lower indicates low risk for opioid abuse		  a score of 4-7 indicates moderate risk for opioid abuse		  a score of 8 or higher indicates high risk for opioid abuse    REVIEW OF SYSTEMS:  CONSTITUTIONAL: No fever or fatigue  HEENT:  No difficulty hearing, no change in vision  NECK: No pain or stiffness  RESPIRATORY: No cough, wheezing, chills or hemoptysis; No shortness of breath  CARDIOVASCULAR: No chest pain, palpitations, dizziness, or leg swelling  GASTROINTESTINAL: No loss of appetite, decreased PO intake. No abdominal or epigastric pain. No nausea, vomiting; No diarrhea or constipation.   GENITOURINARY: No dysuria, frequency, hematuria, retention or incontinence  MUSCULOSKELETAL: +Right knee pain and swelling; No muscle, back, pain, no upper or lower motor strength weakness, no saddle anesthesia, bowel/bladder incontinence, no falls   NEURO: No headaches, No numbness/tingling b/l LE, No weakness  ENDOCRINE: No polyuria, polydipsia, heat or cold intolerance; No hair loss  PSYCHIATRIC: No depression, anxiety or difficulty sleeping    PHYSICAL EXAM:  GENERAL:  Alert & Oriented X4, cooperative, NAD, Good concentration. Speech is clear.   RESPIRATORY: Respirations even and unlabored. Clear to auscultation bilaterally; No rales, rhonchi, wheezing, or rubs  CARDIOVASCULAR: Normal S1/S2, regular rate and rhythm; No murmurs, rubs, or gallops. No JVD.   GASTROINTESTINAL:  Soft, Nontender, Nondistended; Bowel sounds present  PERIPHERAL VASCULAR:  Extremities warm without edema. 2+ Peripheral Pulses, No cyanosis, No calf tenderness  MUSCULOSKELETAL: Motor Strength 5/5 B/L upper and 3/5 lower extremities; moves all extremities equally against gravity; ROM intact; negative SLR; + tenderness on palpation of R knee   SKIN: Warm, dry, intact. No rashes, lesions, or scars. Right knee with dressing in place c/d/i     Risk factors associated with adverse outcomes related to opioid treatment  [ ]  Concurrent benzodiazepine use  [ ]  History/ Active substance use or alcohol use disorder  [ ] Psychiatric co-morbidity  [ ] Sleep apnea  [ ] COPD  [ ] BMI> 35  [ ] Liver dysfunction  [ ] Renal dysfunction  [ ] CHF  [x] Former Smoker  [x]  Age > 60 years    [x]  [place]  Reviewed and Copied to Chart. See below.    Plan of care and goal oriented pain management treatment options were discussed with patient and /or primary care giver; all questions and concerns were addressed and care was aligned with patient's wishes.    Educated patient on goal oriented pain management treatment options     12-20-23 @ 13:43

## 2023-12-20 NOTE — CONSULT NOTE ADULT - TIME BILLING
-history  -exam  -clinical decision making  -communicating with surgical team  -documenting encounter  -coordinating care following RRT event

## 2023-12-20 NOTE — RAPID RESPONSE TEAM SUMMARY - NSSITUATIONBACKGROUNDRRT_GEN_ALL_CORE
68year old male with pmhx of ex-cigarette smoker, prostate cancer, right knee meniscus tear, right foot bone spur and bilateral ankle and knee OA presents with c/o intermittent right knee pain x 29years that has failed to resolve with conservative management and 2 arthroscopic surgeries. Pt received Right Total Knee Replacement on 12/19  While walking with physical therapy, patient had a syncopal episode. Patient mentions feeling nauseous and diaphoretic during the episode. Patient did not fall, and was placed in a seated position. Mentions this never occurred before. Unable to obtain BP during RRT, thus patient was given 1L of IVF through pressure bag, repeat BP was 147/71. FS was normal. Patient felt well after fluids, was AAOx3. Patient will have telemetry placed while in unit

## 2023-12-20 NOTE — PROGRESS NOTE ADULT - SUBJECTIVE AND OBJECTIVE BOX
68yMale    Diagnosis:  S/p R Total Knee Replacement POD# 1    Patient was seen and evaluated at bedside. Patient with no acute complaints.   Pain is well controlled to the RLE.   Denies CP/SOB, dyspnea, paresthesias, N/V/D, palpitations.     Vital Signs Last 24 Hrs  T(C): 37.3 (20 Dec 2023 05:25), Max: 37.3 (19 Dec 2023 21:51)  T(F): 99.2 (20 Dec 2023 05:25), Max: 99.2 (20 Dec 2023 05:25)  HR: 73 (20 Dec 2023 05:25) (51 - 80)  BP: 158/72 (20 Dec 2023 05:25) (111/81 - 161/82)  BP(mean): 107 (20 Dec 2023 02:31) (83 - 107)  RR: 18 (20 Dec 2023 05:25) (12 - 18)  SpO2: 97% (20 Dec 2023 05:25) (96% - 100%)    Parameters below as of 20 Dec 2023 05:25  Patient On (Oxygen Delivery Method): room air      I&O's Detail    19 Dec 2023 07:01  -  20 Dec 2023 07:00  --------------------------------------------------------  IN:    Lactated Ringers Bolus: 1000 mL  Total IN: 1000 mL    OUT:    Blood Loss (mL): 100 mL    Voided (mL): 800 mL  Total OUT: 900 mL    Total NET: 100 mL    Physical Exam:    General: AAOx3, NAD, resting comfortably in bed.    R KNEE:  Dressing is C/D/I. Dressing changed today- Skin is pink and warm. Staples intact. No erythema. SILT.  Wound with no drainage, healing well.   Lower extremity:  No calf tenderness, calves are soft. 2+pulses. NVI. (+)EHL/FHL/ADF/APF.  Good capillary refill.                           10.1   6.97  )-----------( 318      ( 20 Dec 2023 05:45 )             34.4     12-20    139  |  105  |  13  ----------------------------<  113<H>  4.7   |  29  |  0.94    Ca    9.5      20 Dec 2023 05:45        Impression:  69 y/o M S/p R Total Knee Replacement POD# 1  Plan:  -  Pain control  -  DVT prophylaxis  -  Daily Physical Therapy:  WBAT to the RLE  -  Discharge planning: Home  -  Heel bump to RLE  -  Incentive Spirometer  -  Continue with Post-op Antibiotics x 24hrs  -  Dressing changed today  -  Case d/w Dr. Cuevas    68yMale    Diagnosis:  S/p R Total Knee Replacement POD# 1    Patient was seen and evaluated at bedside. Patient with no acute complaints.   Pain is well controlled to the RLE.   Denies CP/SOB, dyspnea, paresthesias, N/V/D, palpitations.     Vital Signs Last 24 Hrs  T(C): 37.3 (20 Dec 2023 05:25), Max: 37.3 (19 Dec 2023 21:51)  T(F): 99.2 (20 Dec 2023 05:25), Max: 99.2 (20 Dec 2023 05:25)  HR: 73 (20 Dec 2023 05:25) (51 - 80)  BP: 158/72 (20 Dec 2023 05:25) (111/81 - 161/82)  BP(mean): 107 (20 Dec 2023 02:31) (83 - 107)  RR: 18 (20 Dec 2023 05:25) (12 - 18)  SpO2: 97% (20 Dec 2023 05:25) (96% - 100%)    Parameters below as of 20 Dec 2023 05:25  Patient On (Oxygen Delivery Method): room air      I&O's Detail    19 Dec 2023 07:01  -  20 Dec 2023 07:00  --------------------------------------------------------  IN:    Lactated Ringers Bolus: 1000 mL  Total IN: 1000 mL    OUT:    Blood Loss (mL): 100 mL    Voided (mL): 800 mL  Total OUT: 900 mL    Total NET: 100 mL    Physical Exam:    General: AAOx3, NAD, resting comfortably in bed.    R KNEE:  Dressing is C/D/I. Dressing changed today- Skin is pink and warm. Staples intact. No erythema. SILT.  Wound with no drainage, healing well.   Lower extremity:  No calf tenderness, calves are soft. 2+pulses. NVI. (+)EHL/FHL/ADF/APF.  Good capillary refill.                           10.1   6.97  )-----------( 318      ( 20 Dec 2023 05:45 )             34.4     12-20    139  |  105  |  13  ----------------------------<  113<H>  4.7   |  29  |  0.94    Ca    9.5      20 Dec 2023 05:45        Impression:  67 y/o M S/p R Total Knee Replacement POD# 1  Plan:  -  Pain control  -  DVT prophylaxis  -  Daily Physical Therapy:  WBAT to the RLE  -  Discharge planning: Home  -  Heel bump to RLE  -  Incentive Spirometer  -  Continue with Post-op Antibiotics x 24hrs  -  Dressing changed today  -  Case d/w Dr. Cuevas

## 2023-12-20 NOTE — CONSULT NOTE ADULT - ATTENDING COMMENTS
I have seen and evaluated the patient along with the resident physician during the rapid response event and again later in the day.     He describes classic prodromal symptoms of vasovagal/neurocardiogenic syncope with preceding symptoms of warmth, nausea, flushing prior to being placed in the sitting position by PT. He has no history of heart disease, CAD, valve disease. He has never fainted before that he can recall. Does not normally struggle with low blood pressure or hypoglycemia.     On exam, he is laying in bed in no acute distress. He is no longer diaphoretic like he was during the event. Neuro exam unremarkable, no focal deficits. Cardiac exam demonstrates regular rate and rhythm without murmurs, rubs or gallops. Lungs are clear to auscultation.     I have reviewed his lactic acid, was 2.6 at time of event and downtrended to < 2 with IV fluids. Cardiac enzymes negative. Cr normal range.     Assessment and Plan:    68M with pmhx of ex-cigarette smoker, prostate cancer, right knee meniscus tear, right foot bone spur and bilateral ankle and knee OA presents with c/o intermittent right knee pain s/p Right Total Knee Replacement on 12/19/2023 had RRT for a near syncopal episode 2/2 vasovagal vs orthostasis.    As above, suspect the event was triggered by a combination of factors, including orthostatic hypotension and pain stimulus. He had little to no oral intake, solids or liquids, the day prior to surgery, the day of surgery, and the morning of 12/20 prior to participating in PT. BP reliably improved with bolus of crystalloid fluids. Will monitor patient on tele for next 24 hours but do not have strong suspicion for structural heart disease so will defer TTE for now.     #Vasovagal Syncope vs Orthostatic Hypotension, Resolved  #Hypotension, improved with IV fluids  #Severe Right Knee OA s/p total arthroplasty on 12/19  #Hx of prostate cancer    -monitor on Tele  -encourage oral hydration  -ok to defer TTE  -12 lead ECG  -trend lactic acid (resolved)   -rest of care per surgery team    -Thank you for this consult. Internal Medicine will continue to follow. Feel free to reach me on [employer] Teams at any time I have seen and evaluated the patient along with the resident physician during the rapid response event and again later in the day.     He describes classic prodromal symptoms of vasovagal/neurocardiogenic syncope with preceding symptoms of warmth, nausea, flushing prior to being placed in the sitting position by PT. He has no history of heart disease, CAD, valve disease. He has never fainted before that he can recall. Does not normally struggle with low blood pressure or hypoglycemia.     On exam, he is laying in bed in no acute distress. He is no longer diaphoretic like he was during the event. Neuro exam unremarkable, no focal deficits. Cardiac exam demonstrates regular rate and rhythm without murmurs, rubs or gallops. Lungs are clear to auscultation.     I have reviewed his lactic acid, was 2.6 at time of event and downtrended to < 2 with IV fluids. Cardiac enzymes negative. Cr normal range.     Assessment and Plan:    68M with pmhx of ex-cigarette smoker, prostate cancer, right knee meniscus tear, right foot bone spur and bilateral ankle and knee OA presents with c/o intermittent right knee pain s/p Right Total Knee Replacement on 12/19/2023 had RRT for a near syncopal episode 2/2 vasovagal vs orthostasis.    As above, suspect the event was triggered by a combination of factors, including orthostatic hypotension and pain stimulus. He had little to no oral intake, solids or liquids, the day prior to surgery, the day of surgery, and the morning of 12/20 prior to participating in PT. BP reliably improved with bolus of crystalloid fluids. Will monitor patient on tele for next 24 hours but do not have strong suspicion for structural heart disease so will defer TTE for now.     #Vasovagal Syncope vs Orthostatic Hypotension, Resolved  #Hypotension, improved with IV fluids  #Severe Right Knee OA s/p total arthroplasty on 12/19  #Hx of prostate cancer    -monitor on Tele  -encourage oral hydration  -ok to defer TTE  -12 lead ECG  -trend lactic acid (resolved)   -rest of care per surgery team    -Thank you for this consult. Internal Medicine will continue to follow. Feel free to reach me on [employer] Teams at any time

## 2023-12-20 NOTE — CONSULT NOTE ADULT - ASSESSMENT
68M with pmhx of ex-cigarette smoker, prostate cancer, right knee meniscus tear, right foot bone spur and bilateral ankle and knee OA presents with c/o intermittent right knee pain s/p Right Total Knee Replacement on 12/19/2023 had RRT for a near syncopal episode 2/2 vasovagal vs orthostasis    #Syncopal Episode  Patient states having nausea and diaphoresis prior to episode  Likely vasovagal  s/p 1L in RRT, with improvement in symptoms  f/u orthostatic v/s  Place on telemetry for 24hours to monitor for any episodes of arrythmia    #Lactic acidosis   Patient with elevated lactate 2.6  likely in setting of hypotension  will give additional 1L of LR  f/u repeat Lactate until resolution    #R total knee replacement  POD#1  care per Ortho team

## 2023-12-20 NOTE — PROGRESS NOTE ADULT - ASSESSMENT
Confidential Drug Utilization Report  Search Terms: Adeel Sellers, 1955 Search Date: 12/19/2023 16:46:24 PM  The Drug Utilization Report below displays all of the controlled substance prescriptions, if any, that your patient has filled in the last twelve months. The information displayed on this report is compiled from pharmacy submissions to the Department, and accurately reflects the information as submitted by the pharmacies.    This report was requested by: Karen Rebolledo | Reference #: [id number]    You have not added a MARIANA number. Keeping your MARIANA number(s) up to date on the My MARIANA # tab will enable the separation of your prescriptions from others in the search results.    Practitioner Count: 2  Pharmacy Count: 2  Current Opioid Prescriptions: 1  Current Benzodiazepine Prescriptions: 0  Current Stimulant Prescriptions: 0      Patient Demographic Information (PDI)       PDI	First Name	Last Name	Birth Date	Gender	Street Address	[facility] Code  A	Adeel Sellers	1955	Male	[address] [facility] [address] [facility]	[address]  B	Adeel Sellers	1955	Male	[address]	[place]	[address]  C	Adeel Sellers	1955	Male	[address] [facility]	[address]    Prescription Information      PDI Filter:    PDI	Current Rx	Drug Type	Rx Written	Rx Dispensed	Drug	Quantity	Days Supply	Prescriber Name	Prescriber MARIANA #	Payment Method	Dispenser  A	N	B	06/03/2023	06/03/2023	diazepam 2 mg tablet	12	4	Janis Granado MD	[id number]	Medicare	[id number]  B	Y	O	12/13/2023	12/15/2023	oxycodone-acetaminophen 7.5-325 mg tablet	28	7	Cornelio Cuevas MD	[id number]	[facility]  C	N	O	10/17/2023	10/18/2023	oxycodone-acetaminophen 5-325 mg tab	40	7	Karan Rangel DPM	[id number]	Insurance	[facility]    * - Details of Drug Type : O = Opioid, B = Benzodiazepine, S = Stimulant    * - Drugs marked with an asterisk are compound drugs. If the compound drug is made up of more than one controlled substance, then each controlled substance will be a separate row in the table.   Confidential Drug Utilization Report  Search Terms: Adeel Sellers, 1955 Search Date: 12/19/2023 16:46:24 PM  The Drug Utilization Report below displays all of the controlled substance prescriptions, if any, that your patient has filled in the last twelve months. The information displayed on this report is compiled from pharmacy submissions to the Department, and accurately reflects the information as submitted by the pharmacies.    This report was requested by: Karen Rebolledo | Reference #: [id number]    You have not added a MARIANA number. Keeping your MARIANA number(s) up to date on the My MARIANA # tab will enable the separation of your prescriptions from others in the search results.    Practitioner Count: 2  Pharmacy Count: 2  Current Opioid Prescriptions: 1  Current Benzodiazepine Prescriptions: 0  Current Stimulant Prescriptions: 0      Patient Demographic Information (PDI)       PDI	First Name	Last Name	Birth Date	Gender	Street Address	[facility] Code  A	Adeel Sellers	1955	Male	[address] [facility]	[address]  B	Adeel Sellers	1955	Male	[address]	[place]	[address]  C	Adeel Selelrs	1955	Male	[address] [facility]	[address]    Prescription Information      PDI Filter:    PDI	Current Rx	Drug Type	Rx Written	Rx Dispensed	Drug	Quantity	Days Supply	Prescriber Name	Prescriber MARIANA #	Payment Method	Dispenser  A	N	B	06/03/2023	06/03/2023	diazepam 2 mg tablet	12	4	Janis Granado MD	[id number]	Medicare	[id number]  B	Y	O	12/13/2023	12/15/2023	oxycodone-acetaminophen 7.5-325 mg tablet	28	7	Cornelio Cuevas MD	[id number]	[facility]  C	N	O	10/17/2023	10/18/2023	oxycodone-acetaminophen 5-325 mg tab	40	7	Karan Rangel DPM	[id number]	Insurance	[facility]    * - Details of Drug Type : O = Opioid, B = Benzodiazepine, S = Stimulant    * - Drugs marked with an asterisk are compound drugs. If the compound drug is made up of more than one controlled substance, then each controlled substance will be a separate row in the table.

## 2023-12-20 NOTE — CONSULT NOTE ADULT - SUBJECTIVE AND OBJECTIVE BOX
Patient is a 68y old  Male who presents with a chief complaint of Right Knee surgery (20 Dec 2023 11:30)      HPI:  68year old male with pmhx of ex-cigarette smoker, prostate cancer, right knee meniscus tear, right foot bone spur and bilateral ankle and knee OA presents with c/o intermittent right knee pain x 29years that has failed to resolve with conservative management and 2 arthroscopic surgeries. Patient is here today for presurgical testing for scheduled Right Total Knee Replacement on 12/19/2023 (11 Dec 2023 13:45)    While walking with physical therapy, patient had a syncopal episode. Patient mentions feeling nauseous and diaphoretic during the episode. Patient did not fall, and was placed in a seated position. Mentions this never occurred before. Unable to obtain BP during RRT, thus patient was given 1L of IVF through pressure bag, repeat BP was 147/71. FS was normal. Patient felt well after fluids, was AAOx3. Patient will have telemetry placed while in       Allergies    sulfa drugs (Hives)    Intolerances        MEDICATIONS  (STANDING):  chlorhexidine 2% Cloths 1 Application(s) Topical once  enoxaparin Injectable 30 milliGRAM(s) SubCutaneous every 12 hours  ferrous    sulfate 325 milliGRAM(s) Oral daily  pantoprazole    Tablet 40 milliGRAM(s) Oral before breakfast  polyethylene glycol 3350 17 Gram(s) Oral at bedtime  senna 2 Tablet(s) Oral at bedtime  sodium chloride 0.9% lock flush 3 milliLiter(s) IV Push every 8 hours  sodium chloride 0.9%. 1000 milliLiter(s) (110 mL/Hr) IV Continuous <Continuous>  traMADol 50 milliGRAM(s) Oral every 8 hours    MEDICATIONS  (PRN):  ketorolac   Injectable 15 milliGRAM(s) IV Push every 6 hours PRN Severe Pain (7 - 10)  magnesium hydroxide Suspension 30 milliLiter(s) Oral daily PRN Constipation  ondansetron Injectable 4 milliGRAM(s) IV Push every 6 hours PRN Nausea and/or Vomiting  oxyCODONE    IR 5 milliGRAM(s) Oral every 4 hours PRN Moderate Pain (4 - 6)      Daily     Daily     Drug Dosing Weight  Height (cm): 170.2 (19 Dec 2023 09:45)  Weight (kg): 70.3 (19 Dec 2023 09:45)  BMI (kg/m2): 24.3 (19 Dec 2023 09:45)  BSA (m2): 1.81 (19 Dec 2023 09:45)    PAST MEDICAL & SURGICAL HISTORY:  Pinched nerve      OA (osteoarthritis)      Torn meniscus      OA (osteoarthritis) of ankle      Ex-cigarette smoker      H/O arthroscopy of knee      History of ankle surgery      History of foot surgery          FAMILY HISTORY:      SOCIAL HISTORY:    ADVANCE DIRECTIVES:    REVIEW OF SYSTEMS:    CONSTITUTIONAL: No fever, weight loss, or fatigue  EYES: No eye pain, visual disturbances, or discharge  ENMT:  No difficulty hearing, tinnitus, vertigo; No sinus or throat pain  NECK: No pain or stiffness  BREASTS: No pain, masses, or nipple discharge  RESPIRATORY: No cough, wheezing, chills or hemoptysis; No shortness of breath  CARDIOVASCULAR: No chest pain, palpitations, dizziness, or leg swelling  GASTROINTESTINAL: No abdominal or epigastric pain. No nausea, vomiting, or hematemesis; No diarrhea or constipation. No melena or hematochezia.  GENITOURINARY: No dysuria, frequency, hematuria, or incontinence  NEUROLOGICAL: No headaches, memory loss, loss of strength, numbness, or tremors  SKIN: No itching, burning, rashes, or lesions   LYMPH NODES: No enlarged glands  ENDOCRINE: No heat or cold intolerance; No hair loss  MUSCULOSKELETAL: No joint pain or swelling; No muscle, back, or extremity pain  PSYCHIATRIC: No depression, anxiety, mood swings, or difficulty sleeping  HEME/LYMPH: No easy bruising, or bleeding gums  ALLERGY AND IMMUNOLOGIC: No hives or eczema          ICU Vital Signs Last 24 Hrs  T(C): 37.3 (20 Dec 2023 05:25), Max: 37.3 (19 Dec 2023 21:51)  T(F): 99.2 (20 Dec 2023 05:25), Max: 99.2 (20 Dec 2023 05:25)  HR: 86 (20 Dec 2023 12:52) (51 - 86)  BP: 162/95 (20 Dec 2023 12:52) (111/81 - 162/95)  BP(mean): 117 (20 Dec 2023 12:52) (83 - 117)  ABP: --  ABP(mean): --  RR: 18 (20 Dec 2023 12:52) (12 - 18)  SpO2: 96% (20 Dec 2023 12:52) (96% - 100%)    O2 Parameters below as of 20 Dec 2023 12:52  Patient On (Oxygen Delivery Method): room air                I&O's Detail    19 Dec 2023 07:01  -  20 Dec 2023 07:00  --------------------------------------------------------  IN:    Lactated Ringers Bolus: 1000 mL  Total IN: 1000 mL    OUT:    Blood Loss (mL): 100 mL    Voided (mL): 800 mL  Total OUT: 900 mL    Total NET: 100 mL          PHYSICAL EXAM:    GENERAL: NAD, well-groomed, well-developed  HEAD:  Atraumatic, Normocephalic  EYES: EOMI, PERRLA, conjunctiva and sclera clear  ENMT: No tonsillar erythema, exudates, or enlargement; Moist mucous membranes, Good dentition, No lesions  NECK: Supple, No JVD, Normal thyroid  NERVOUS SYSTEM:  Alert & Oriented X3, Good concentration; Motor Strength 5/5 B/L upper and lower extremities; DTRs 2+ intact and symmetric  CHEST/LUNG: Clear to percussion bilaterally; No rales, rhonchi, wheezing, or rubs  HEART: Regular rate and rhythm; No murmurs, rubs, or gallops  ABDOMEN: Soft, Nontender, Nondistended; Bowel sounds present  EXTREMITIES:  2+ Peripheral Pulses, No clubbing, cyanosis, or edema  LYMPH: No lymphadenopathy noted  SKIN: No rashes or lesions    LABS:  CBC Full  -  ( 20 Dec 2023 12:32 )  WBC Count : 11.74 K/uL  RBC Count : 4.09 M/uL  Hemoglobin : 9.8 g/dL  Hematocrit : 33.2 %  Platelet Count - Automated : 381 K/uL  Mean Cell Volume : 81.2 fl  Mean Cell Hemoglobin : 24.0 pg  Mean Cell Hemoglobin Concentration : 29.5 gm/dL  Auto Neutrophil # : x  Auto Lymphocyte # : x  Auto Monocyte # : x  Auto Eosinophil # : x  Auto Basophil # : x  Auto Neutrophil % : x  Auto Lymphocyte % : x  Auto Monocyte % : x  Auto Eosinophil % : x  Auto Basophil % : x    12-20    136  |  102  |  13  ----------------------------<  144<H>  4.1   |  27  |  1.15    Ca    8.3<L>      20 Dec 2023 12:32  Phos  2.9     12-20  Mg     1.9     12-20    TPro  7.3  /  Alb  2.4<L>  /  TBili  0.5  /  DBili  x   /  AST  12  /  ALT  15  /  AlkPhos  92  12-20    CAPILLARY BLOOD GLUCOSE      POCT Blood Glucose.: 171 mg/dL (20 Dec 2023 12:16)      Urinalysis Basic - ( 20 Dec 2023 12:32 )    Color: x / Appearance: x / SG: x / pH: x  Gluc: 144 mg/dL / Ketone: x  / Bili: x / Urobili: x   Blood: x / Protein: x / Nitrite: x   Leuk Esterase: x / RBC: x / WBC x   Sq Epi: x / Non Sq Epi: x / Bacteria: x      CARDIAC MARKERS ( 20 Dec 2023 12:32 )  x     / x     / 180 U/L / x     / <1.0 ng/mL          EKG:    ECHO, US:    RADIOLOGY:    CRITICAL CARE TIME SPENT:

## 2023-12-21 ENCOUNTER — TRANSCRIPTION ENCOUNTER (OUTPATIENT)
Age: 68
End: 2023-12-21

## 2023-12-21 LAB
ANION GAP SERPL CALC-SCNC: 4 MMOL/L — LOW (ref 5–17)
ANION GAP SERPL CALC-SCNC: 4 MMOL/L — LOW (ref 5–17)
APPEARANCE UR: ABNORMAL
APPEARANCE UR: ABNORMAL
BACTERIA # UR AUTO: ABNORMAL /HPF
BACTERIA # UR AUTO: ABNORMAL /HPF
BILIRUB UR-MCNC: NEGATIVE — SIGNIFICANT CHANGE UP
BILIRUB UR-MCNC: NEGATIVE — SIGNIFICANT CHANGE UP
BUN SERPL-MCNC: 9 MG/DL — SIGNIFICANT CHANGE UP (ref 7–18)
BUN SERPL-MCNC: 9 MG/DL — SIGNIFICANT CHANGE UP (ref 7–18)
CALCIUM SERPL-MCNC: 8 MG/DL — LOW (ref 8.4–10.5)
CALCIUM SERPL-MCNC: 8 MG/DL — LOW (ref 8.4–10.5)
CHLORIDE SERPL-SCNC: 106 MMOL/L — SIGNIFICANT CHANGE UP (ref 96–108)
CHLORIDE SERPL-SCNC: 106 MMOL/L — SIGNIFICANT CHANGE UP (ref 96–108)
CO2 SERPL-SCNC: 28 MMOL/L — SIGNIFICANT CHANGE UP (ref 22–31)
CO2 SERPL-SCNC: 28 MMOL/L — SIGNIFICANT CHANGE UP (ref 22–31)
COLOR SPEC: YELLOW — SIGNIFICANT CHANGE UP
COLOR SPEC: YELLOW — SIGNIFICANT CHANGE UP
CREAT SERPL-MCNC: 0.87 MG/DL — SIGNIFICANT CHANGE UP (ref 0.5–1.3)
CREAT SERPL-MCNC: 0.87 MG/DL — SIGNIFICANT CHANGE UP (ref 0.5–1.3)
DIFF PNL FLD: ABNORMAL
DIFF PNL FLD: ABNORMAL
EGFR: 93 ML/MIN/1.73M2 — SIGNIFICANT CHANGE UP
EGFR: 93 ML/MIN/1.73M2 — SIGNIFICANT CHANGE UP
EPI CELLS # UR: SIGNIFICANT CHANGE UP
EPI CELLS # UR: SIGNIFICANT CHANGE UP
GLUCOSE SERPL-MCNC: 105 MG/DL — HIGH (ref 70–99)
GLUCOSE SERPL-MCNC: 105 MG/DL — HIGH (ref 70–99)
GLUCOSE UR QL: 100 MG/DL
GLUCOSE UR QL: 100 MG/DL
HCT VFR BLD CALC: 30.7 % — LOW (ref 39–50)
HCT VFR BLD CALC: 30.7 % — LOW (ref 39–50)
HGB BLD-MCNC: 9.1 G/DL — LOW (ref 13–17)
HGB BLD-MCNC: 9.1 G/DL — LOW (ref 13–17)
KETONES UR-MCNC: ABNORMAL MG/DL
KETONES UR-MCNC: ABNORMAL MG/DL
LEUKOCYTE ESTERASE UR-ACNC: NEGATIVE — SIGNIFICANT CHANGE UP
LEUKOCYTE ESTERASE UR-ACNC: NEGATIVE — SIGNIFICANT CHANGE UP
MCHC RBC-ENTMCNC: 24.2 PG — LOW (ref 27–34)
MCHC RBC-ENTMCNC: 24.2 PG — LOW (ref 27–34)
MCHC RBC-ENTMCNC: 29.6 GM/DL — LOW (ref 32–36)
MCHC RBC-ENTMCNC: 29.6 GM/DL — LOW (ref 32–36)
MCV RBC AUTO: 81.6 FL — SIGNIFICANT CHANGE UP (ref 80–100)
MCV RBC AUTO: 81.6 FL — SIGNIFICANT CHANGE UP (ref 80–100)
NITRITE UR-MCNC: NEGATIVE — SIGNIFICANT CHANGE UP
NITRITE UR-MCNC: NEGATIVE — SIGNIFICANT CHANGE UP
NRBC # BLD: 0 /100 WBCS — SIGNIFICANT CHANGE UP (ref 0–0)
NRBC # BLD: 0 /100 WBCS — SIGNIFICANT CHANGE UP (ref 0–0)
PH UR: 5.5 — SIGNIFICANT CHANGE UP (ref 5–8)
PH UR: 5.5 — SIGNIFICANT CHANGE UP (ref 5–8)
PLATELET # BLD AUTO: 271 K/UL — SIGNIFICANT CHANGE UP (ref 150–400)
PLATELET # BLD AUTO: 271 K/UL — SIGNIFICANT CHANGE UP (ref 150–400)
POTASSIUM SERPL-MCNC: 3.9 MMOL/L — SIGNIFICANT CHANGE UP (ref 3.5–5.3)
POTASSIUM SERPL-MCNC: 3.9 MMOL/L — SIGNIFICANT CHANGE UP (ref 3.5–5.3)
POTASSIUM SERPL-SCNC: 3.9 MMOL/L — SIGNIFICANT CHANGE UP (ref 3.5–5.3)
POTASSIUM SERPL-SCNC: 3.9 MMOL/L — SIGNIFICANT CHANGE UP (ref 3.5–5.3)
PROT UR-MCNC: NEGATIVE MG/DL — SIGNIFICANT CHANGE UP
PROT UR-MCNC: NEGATIVE MG/DL — SIGNIFICANT CHANGE UP
RBC # BLD: 3.76 M/UL — LOW (ref 4.2–5.8)
RBC # BLD: 3.76 M/UL — LOW (ref 4.2–5.8)
RBC # FLD: 14.6 % — HIGH (ref 10.3–14.5)
RBC # FLD: 14.6 % — HIGH (ref 10.3–14.5)
RBC CASTS # UR COMP ASSIST: 2 /HPF — SIGNIFICANT CHANGE UP (ref 0–4)
RBC CASTS # UR COMP ASSIST: 2 /HPF — SIGNIFICANT CHANGE UP (ref 0–4)
SODIUM SERPL-SCNC: 138 MMOL/L — SIGNIFICANT CHANGE UP (ref 135–145)
SODIUM SERPL-SCNC: 138 MMOL/L — SIGNIFICANT CHANGE UP (ref 135–145)
SP GR SPEC: 1.02 — SIGNIFICANT CHANGE UP (ref 1–1.03)
SP GR SPEC: 1.02 — SIGNIFICANT CHANGE UP (ref 1–1.03)
UROBILINOGEN FLD QL: 0.2 MG/DL — SIGNIFICANT CHANGE UP (ref 0.2–1)
UROBILINOGEN FLD QL: 0.2 MG/DL — SIGNIFICANT CHANGE UP (ref 0.2–1)
WBC # BLD: 7.95 K/UL — SIGNIFICANT CHANGE UP (ref 3.8–10.5)
WBC # BLD: 7.95 K/UL — SIGNIFICANT CHANGE UP (ref 3.8–10.5)
WBC # FLD AUTO: 7.95 K/UL — SIGNIFICANT CHANGE UP (ref 3.8–10.5)
WBC # FLD AUTO: 7.95 K/UL — SIGNIFICANT CHANGE UP (ref 3.8–10.5)
WBC UR QL: 0 /HPF — SIGNIFICANT CHANGE UP (ref 0–5)
WBC UR QL: 0 /HPF — SIGNIFICANT CHANGE UP (ref 0–5)

## 2023-12-21 PROCEDURE — 99232 SBSQ HOSP IP/OBS MODERATE 35: CPT

## 2023-12-21 PROCEDURE — 71045 X-RAY EXAM CHEST 1 VIEW: CPT | Mod: 26

## 2023-12-21 RX ORDER — PANTOPRAZOLE SODIUM 20 MG/1
1 TABLET, DELAYED RELEASE ORAL
Qty: 0 | Refills: 0 | DISCHARGE

## 2023-12-21 RX ORDER — ACETAMINOPHEN 500 MG
975 TABLET ORAL EVERY 6 HOURS
Refills: 0 | Status: COMPLETED | OUTPATIENT
Start: 2023-12-21 | End: 2023-12-22

## 2023-12-21 RX ORDER — FERROUS SULFATE 325(65) MG
1 TABLET ORAL
Qty: 0 | Refills: 0 | DISCHARGE

## 2023-12-21 RX ORDER — ACETAMINOPHEN 500 MG
1 TABLET ORAL
Refills: 0 | DISCHARGE

## 2023-12-21 RX ORDER — RIVAROXABAN 15 MG-20MG
1 KIT ORAL
Qty: 0 | Refills: 0 | DISCHARGE

## 2023-12-21 RX ORDER — ASPIRIN/CALCIUM CARB/MAGNESIUM 324 MG
1 TABLET ORAL
Qty: 0 | Refills: 0 | DISCHARGE

## 2023-12-21 RX ORDER — GABAPENTIN 400 MG/1
1 CAPSULE ORAL
Qty: 0 | Refills: 0 | DISCHARGE

## 2023-12-21 RX ORDER — OXYCODONE AND ACETAMINOPHEN 5; 325 MG/1; MG/1
1 TABLET ORAL
Qty: 0 | Refills: 0 | DISCHARGE

## 2023-12-21 RX ORDER — POLYETHYLENE GLYCOL 3350 17 G/17G
17 POWDER, FOR SOLUTION ORAL
Qty: 0 | Refills: 0 | DISCHARGE

## 2023-12-21 RX ORDER — SENNA PLUS 8.6 MG/1
1 TABLET ORAL
Qty: 0 | Refills: 0 | DISCHARGE

## 2023-12-21 RX ADMIN — SODIUM CHLORIDE 3 MILLILITER(S): 9 INJECTION INTRAMUSCULAR; INTRAVENOUS; SUBCUTANEOUS at 22:07

## 2023-12-21 RX ADMIN — POLYETHYLENE GLYCOL 3350 17 GRAM(S): 17 POWDER, FOR SOLUTION ORAL at 22:01

## 2023-12-21 RX ADMIN — OXYCODONE HYDROCHLORIDE 5 MILLIGRAM(S): 5 TABLET ORAL at 11:26

## 2023-12-21 RX ADMIN — PANTOPRAZOLE SODIUM 40 MILLIGRAM(S): 20 TABLET, DELAYED RELEASE ORAL at 05:45

## 2023-12-21 RX ADMIN — Medication 975 MILLIGRAM(S): at 23:42

## 2023-12-21 RX ADMIN — Medication 325 MILLIGRAM(S): at 11:25

## 2023-12-21 RX ADMIN — Medication 15 MILLIGRAM(S): at 02:12

## 2023-12-21 RX ADMIN — ENOXAPARIN SODIUM 30 MILLIGRAM(S): 100 INJECTION SUBCUTANEOUS at 02:06

## 2023-12-21 RX ADMIN — Medication 15 MILLIGRAM(S): at 02:30

## 2023-12-21 RX ADMIN — Medication 975 MILLIGRAM(S): at 19:05

## 2023-12-21 RX ADMIN — Medication 975 MILLIGRAM(S): at 11:25

## 2023-12-21 RX ADMIN — ENOXAPARIN SODIUM 30 MILLIGRAM(S): 100 INJECTION SUBCUTANEOUS at 23:42

## 2023-12-21 RX ADMIN — MAGNESIUM HYDROXIDE 30 MILLILITER(S): 400 TABLET, CHEWABLE ORAL at 17:55

## 2023-12-21 RX ADMIN — SENNA PLUS 2 TABLET(S): 8.6 TABLET ORAL at 22:01

## 2023-12-21 RX ADMIN — Medication 15 MILLIGRAM(S): at 22:02

## 2023-12-21 RX ADMIN — TRAMADOL HYDROCHLORIDE 50 MILLIGRAM(S): 50 TABLET ORAL at 05:45

## 2023-12-21 RX ADMIN — SODIUM CHLORIDE 3 MILLILITER(S): 9 INJECTION INTRAMUSCULAR; INTRAVENOUS; SUBCUTANEOUS at 05:30

## 2023-12-21 RX ADMIN — Medication 975 MILLIGRAM(S): at 17:35

## 2023-12-21 RX ADMIN — Medication 975 MILLIGRAM(S): at 18:11

## 2023-12-21 RX ADMIN — ENOXAPARIN SODIUM 30 MILLIGRAM(S): 100 INJECTION SUBCUTANEOUS at 11:26

## 2023-12-21 RX ADMIN — TRAMADOL HYDROCHLORIDE 50 MILLIGRAM(S): 50 TABLET ORAL at 22:01

## 2023-12-21 NOTE — DISCHARGE NOTE PROVIDER - NSDCCPTREATMENT_GEN_ALL_CORE_FT
PRINCIPAL PROCEDURE  Procedure: Right total knee replacement  Findings and Treatment: S/p Right total knee replacement in 12/19/23

## 2023-12-21 NOTE — DISCHARGE NOTE PROVIDER - HOSPITAL COURSE
Pt is a 67 y/o M who underwent elective R Total knee replacement on 12/19/23. Patient with likely vasovagal syncope when attempting to walk with physical therapy the day after surgery. Patient with temperature of 101.7 on 12/20/23, CXR and UA ordered. Result _____________________Pt received daily Physical therapy and Deep vein thrombosis prophylaxis postoperatively. Stable for discharge home. Pt is a 69 y/o M who underwent elective R Total knee replacement on 12/19/23. Patient with likely vasovagal syncope when attempting to walk with physical therapy the day after surgery. Patient with temperature of 101.7 on 12/20/23, CXR and UA ordered. Result _____________________Pt received daily Physical therapy and Deep vein thrombosis prophylaxis postoperatively. Stable for discharge home. Pt is a 69 y/o M who underwent elective R Total knee replacement on 12/19/23. Patient with likely vasovagal syncope when attempting to walk with physical therapy the day after surgery. Patient with feveres on 12/20, 12/21. CXR and UA ordered. Result negative. Pt received daily Physical therapy and Deep vein thrombosis prophylaxis postoperatively. Stable for discharge home.

## 2023-12-21 NOTE — DISCHARGE NOTE PROVIDER - NSDCCPCAREPLAN_GEN_ALL_CORE_FT
PRINCIPAL DISCHARGE DIAGNOSIS  Diagnosis: Primary osteoarthritis of right knee  Assessment and Plan of Treatment: Pain Management- *See Attached Medication Reconciliation  *  **StretchStrap Stretching exercises for Total knee replacement***  Weight Bearing Status:  WBAT to _LE with walker  Equipment needs: Commode, Walker  Dressing: Please keep bandage/dressing Clean, Dry, and Intact.  Mepilex dressing to be removed only if not in good condition or when staples are to be removed. If mepllex dressing is violated, change with dressing every 2- 3 days with 4x4, abd pads, and ACE bandage.   Dvt prophylaxis: D/C Xarelto 10 mg in 12 days then start Aspirin 325mg BID for 15 days on Incision site: NURSING to remove staples on 1/3/23  PT/Occupational Therapy are Activities of Daily Living as appropriate  Follow up with Dr. Cuevas in 2 WEEKS at [phone] after EMILY ARE REMOVED     PRINCIPAL DISCHARGE DIAGNOSIS  Diagnosis: Primary osteoarthritis of right knee  Assessment and Plan of Treatment: Pain Management- *See Attached Medication Reconciliation  *  **StretchStrap Stretching exercises for Total knee replacement***  Weight Bearing Status:  WBAT to _LE with walker  Equipment needs: Commode, Walker  Dressing: Please keep bandage/dressing Clean, Dry, and Intact.  Mepilex dressing to be removed only if not in good condition or when staples are to be removed. If mepllex dressing is violated, change with dressing every 2- 3 days with 4x4, abd pads, and ACE bandage.   Dvt prophylaxis: D/C Xarelto 10 mg in 12 days then start Aspirin 325mg BID for 15 days on Incision site: NURSING to remove staples on 1/3/23  PT/Occupational Therapy are Activities of Daily Living as appropriate  Follow up with Dr. Cuevas in 2 WEEKS at [phone] after EMILY ARE REMOVED

## 2023-12-21 NOTE — DISCHARGE NOTE PROVIDER - CARE PROVIDER_API CALL
Cornelio Cuevas  Orthopaedic Surgery  [address] [place] [address], [address] [place] [address]  Phone: [phone]  Fax: [phone]  Follow Up Time: 2 weeks   Cornelio Cuevas  Orthopaedic Surgery  [address] [place] [address], [address]th [place] [address]  Phone: [phone]  Fax: [phone]  Follow Up Time: 2 weeks

## 2023-12-21 NOTE — PROGRESS NOTE ADULT - SUBJECTIVE AND OBJECTIVE BOX
PGY-3 Progress Note discussed with attending    Please contact on TEAMS TILL 5:00 PM  PLEASE CONTACT ON CALL TEAM:  - On Call Team (Please refer to Heriberto) FROM 5:00 PM - 8:30PM  - Nightfloat Team FROM 8:30 -7:30 AM    CHIEF COMPLAINT & BRIEF HOSPITAL COURSE:    INTERVAL HPI/OVERNIGHT EVENTS:       REVIEW OF SYSTEMS:  CONSTITUTIONAL: +fever. No weight loss, or fatigue  RESPIRATORY: No cough, wheezing, chills or hemoptysis; No shortness of breath  CARDIOVASCULAR: No chest pain, palpitations, dizziness, or leg swelling  GASTROINTESTINAL: No abdominal pain. No nausea, vomiting, or hematemesis; No diarrhea or constipation. No melena or hematochezia.  GENITOURINARY: No dysuria or hematuria, urinary frequency  NEUROLOGICAL: No headaches, memory loss, loss of strength, numbness, or tremors  MSK: right knee pain  SKIN: Erythematous R. knee    Vital Signs Last 24 Hrs  T(C): 37.1 (21 Dec 2023 05:20), Max: 38.7 (20 Dec 2023 21:43)  T(F): 98.8 (21 Dec 2023 05:20), Max: 101.7 (20 Dec 2023 21:43)  HR: 73 (21 Dec 2023 05:20) (72 - 96)  BP: 141/72 (21 Dec 2023 05:20) (127/55 - 162/95)  BP(mean): 117 (20 Dec 2023 12:52) (105 - 117)  RR: 18 (21 Dec 2023 05:20) (18 - 18)  SpO2: 96% (21 Dec 2023 05:20) (96% - 97%)    Parameters below as of 21 Dec 2023 05:20  Patient On (Oxygen Delivery Method): room air        PHYSICAL EXAMINATION:  GENERAL: NAD, well built  HEAD:  Atraumatic, Normocephalic  EYES:  conjunctiva and sclera clear  NECK: Supple, No JVD, Normal thyroid  CHEST/LUNG: Clear to auscultation. Clear to percussion bilaterally; No rales, rhonchi, wheezing, or rubs  HEART: Regular rate and rhythm; No murmurs, rubs, or gallops  ABDOMEN: Soft, Nontender, Nondistended; Bowel sounds present  NERVOUS SYSTEM:  Alert & Oriented X3,    EXTREMITIES: R. knee ttp.   SKIN: R. knee warm and erythematous                        9.1    7.95  )-----------( 271      ( 21 Dec 2023 06:48 )             30.7     12-21    138  |  106  |  9   ----------------------------<  105<H>  3.9   |  28  |  0.87    Ca    8.0<L>      21 Dec 2023 06:48  Phos  2.9     12-20  Mg     1.9     12-20    TPro  7.3  /  Alb  2.4<L>  /  TBili  0.5  /  DBili  x   /  AST  12  /  ALT  15  /  AlkPhos  92  12-20    LIVER FUNCTIONS - ( 20 Dec 2023 12:32 )  Alb: 2.4 g/dL / Pro: 7.3 g/dL / ALK PHOS: 92 U/L / ALT: 15 U/L DA / AST: 12 U/L / GGT: x           CARDIAC MARKERS ( 20 Dec 2023 12:32 )  x     / x     / 180 U/L / x     / <1.0 ng/mL          CAPILLARY BLOOD GLUCOSE      RADIOLOGY & ADDITIONAL TESTS:

## 2023-12-21 NOTE — PROGRESS NOTE ADULT - SUBJECTIVE AND OBJECTIVE BOX
68yMale    Diagnosis:  S/p R Total Knee Replacement POD# 2    Patient was seen and evaluated at bedside. Patient had RRT yesterday while performing PT. no LOC or falls. RRT passed without issue.   patient did have fever 101.7 F last night -patient denies feeling hot or feverish.   Pt denies Chest pain, SOB, dyspnea, paresthesias, N/V/D, abdominal pain, syncope, or pain anywhere else.   Pain is well controlled to the RLE today with some tightness and swelling.     Vital Signs Last 24 Hrs  T(C): 37.1 (21 Dec 2023 05:20), Max: 38.7 (20 Dec 2023 21:43)  T(F): 98.8 (21 Dec 2023 05:20), Max: 101.7 (20 Dec 2023 21:43)  HR: 73 (21 Dec 2023 05:20) (72 - 96)  BP: 141/72 (21 Dec 2023 05:20) (127/55 - 162/95)  BP(mean): 117 (20 Dec 2023 12:52) (105 - 117)  RR: 18 (21 Dec 2023 05:20) (18 - 18)  SpO2: 96% (21 Dec 2023 05:20) (96% - 97%)    Parameters below as of 21 Dec 2023 05:20  Patient On (Oxygen Delivery Method): room air        Physical Exam:    General: AAOx3, NAD, resting comfortably in bed.    R KNEE:  Dressing is C/D/I. Dressing has minimal uptake at the 1st 3rd of the proximal portion of the mepilex - reassurance given to the patient that this is normal   Skin is pink and warm. Staples intact. No erythema. SILT.  Wound with no drainage, healing well.   Lower extremity:  No calf tenderness, calves are soft. 2+pulses. NVI. (+)EHL/FHL/ADF/APF.  Good capillary refill.                                    9.1    7.95  )-----------( 271      ( 21 Dec 2023 06:48 )             30.7   12-21    138  |  106  |  9   ----------------------------<  105<H>  3.9   |  28  |  0.87    Ca    8.0<L>      21 Dec 2023 06:48  Phos  2.9     12-20  Mg     1.9     12-20    TPro  7.3  /  Alb  2.4<L>  /  TBili  0.5  /  DBili  x   /  AST  12  /  ALT  15  /  AlkPhos  92  12-20          Impression:  67 y/o M S/p R Total Knee Replacement POD# 2  Plan:    - f/u CXR, UA  -  Pain control  -  DVT prophylaxis  -  Daily Physical Therapy:  WBAT to the RLE  -  Discharge planning: Home tomorrow   -  Heel bump to RLE  -  Incentive Spirometer  -  Case d/w Dr. Cuevas    68yMale    Diagnosis:  S/p R Total Knee Replacement POD# 2    Patient was seen and evaluated at bedside. Patient had RRT yesterday while performing PT. no LOC or falls. RRT passed without issue.   patient did have fever 101.7 F last night -patient denies feeling hot or feverish.   Pt denies Chest pain, SOB, dyspnea, paresthesias, N/V/D, abdominal pain, syncope, or pain anywhere else.   Pain is well controlled to the RLE today with some tightness and swelling.     Vital Signs Last 24 Hrs  T(C): 37.1 (21 Dec 2023 05:20), Max: 38.7 (20 Dec 2023 21:43)  T(F): 98.8 (21 Dec 2023 05:20), Max: 101.7 (20 Dec 2023 21:43)  HR: 73 (21 Dec 2023 05:20) (72 - 96)  BP: 141/72 (21 Dec 2023 05:20) (127/55 - 162/95)  BP(mean): 117 (20 Dec 2023 12:52) (105 - 117)  RR: 18 (21 Dec 2023 05:20) (18 - 18)  SpO2: 96% (21 Dec 2023 05:20) (96% - 97%)    Parameters below as of 21 Dec 2023 05:20  Patient On (Oxygen Delivery Method): room air        Physical Exam:    General: AAOx3, NAD, resting comfortably in bed.    R KNEE:  Dressing is C/D/I. Dressing has minimal uptake at the 1st 3rd of the proximal portion of the mepilex - reassurance given to the patient that this is normal   Skin is pink and warm. Staples intact. No erythema. SILT.  Wound with no drainage, healing well.   Lower extremity:  No calf tenderness, calves are soft. 2+pulses. NVI. (+)EHL/FHL/ADF/APF.  Good capillary refill.                                    9.1    7.95  )-----------( 271      ( 21 Dec 2023 06:48 )             30.7   12-21    138  |  106  |  9   ----------------------------<  105<H>  3.9   |  28  |  0.87    Ca    8.0<L>      21 Dec 2023 06:48  Phos  2.9     12-20  Mg     1.9     12-20    TPro  7.3  /  Alb  2.4<L>  /  TBili  0.5  /  DBili  x   /  AST  12  /  ALT  15  /  AlkPhos  92  12-20          Impression:  69 y/o M S/p R Total Knee Replacement POD# 2  Plan:    - f/u CXR, UA  -  Pain control  -  DVT prophylaxis  -  Daily Physical Therapy:  WBAT to the RLE  -  Discharge planning: Home tomorrow   -  Heel bump to RLE  -  Incentive Spirometer  -  Case d/w Dr. Cuevas

## 2023-12-21 NOTE — PROGRESS NOTE ADULT - ASSESSMENT
Confidential Drug Utilization Report  Search Terms: Adeel Sellers, 1955 Search Date: 12/19/2023 16:46:24 PM  The Drug Utilization Report below displays all of the controlled substance prescriptions, if any, that your patient has filled in the last twelve months. The information displayed on this report is compiled from pharmacy submissions to the Department, and accurately reflects the information as submitted by the pharmacies.    This report was requested by: Karen Rebolledo | Reference #: [id number]    You have not added a MARIANA number. Keeping your MARIANA number(s) up to date on the My MARIANA # tab will enable the separation of your prescriptions from others in the search results.    Practitioner Count: 2  Pharmacy Count: 2  Current Opioid Prescriptions: 1  Current Benzodiazepine Prescriptions: 0  Current Stimulant Prescriptions: 0      Patient Demographic Information (PDI)       PDI	First Name	Last Name	Birth Date	Gender	Street Address	[facility] Code  A	Adeel Sellers	1955	Male	[address] [facility]	[id number]  B	Adeel Sellers	1955	Male	[address]	[place]	[id number]  C	Adeel Sellers	1955	Male	[address] [facility] [address] [facility]	[id number]    Prescription Information      PDI Filter:    PDI	Current Rx	Drug Type	Rx Written	Rx Dispensed	Drug	Quantity	Days Supply	Prescriber Name	Prescriber MARIANA #	Payment Method	Dispenser  A	N	B	06/03/2023	06/03/2023	diazepam 2 mg tablet	12	4	Janis Granado MD	[id number]	Medicare	[id number]  B	Y	O	12/13/2023	12/15/2023	oxycodone-acetaminophen 7.5-325 mg tablet	28	7	Cornelio Cuevas MD	[id number]	[facility]  C	N	O	10/17/2023	10/18/2023	oxycodone-acetaminophen 5-325 mg tab	40	7	Karan Rangel DPM	[id number]	Insurance	[facility]    * - Details of Drug Type : O = Opioid, B = Benzodiazepine, S = Stimulant    * - Drugs marked with an asterisk are compound drugs. If the compound drug is made up of more than one controlled substance, then each controlled substance will be a separate row in the table.   Confidential Drug Utilization Report  Search Terms: Adeel Sellers, 1955 Search Date: 12/19/2023 16:46:24 PM  The Drug Utilization Report below displays all of the controlled substance prescriptions, if any, that your patient has filled in the last twelve months. The information displayed on this report is compiled from pharmacy submissions to the Department, and accurately reflects the information as submitted by the pharmacies.    This report was requested by: Karen Rebolledo | Reference #: [id number]    You have not added a MARIANA number. Keeping your MARIANA number(s) up to date on the My MARIANA # tab will enable the separation of your prescriptions from others in the search results.    Practitioner Count: 2  Pharmacy Count: 2  Current Opioid Prescriptions: 1  Current Benzodiazepine Prescriptions: 0  Current Stimulant Prescriptions: 0      Patient Demographic Information (PDI)       PDI	First Name	Last Name	Birth Date	Gender	Street Address	[facility] Code  A	Adeel Sellers	1955	Male	[address] [facility]	[id number]  B	Adeel Sellers	1955	Male	[address]	[place]	[id number]  C	Adeel Sellers	1955	Male	[address] [facility] [address] [facility]	[id number]    Prescription Information      PDI Filter:    PDI	Current Rx	Drug Type	Rx Written	Rx Dispensed	Drug	Quantity	Days Supply	Prescriber Name	Prescriber MARIANA #	Payment Method	Dispenser  A	N	B	06/03/2023	06/03/2023	diazepam 2 mg tablet	12	4	Janis Granado MD	[id number]	Medicare	[id number]  B	Y	O	12/13/2023	12/15/2023	oxycodone-acetaminophen 7.5-325 mg tablet	28	7	Cornelio Cuevas MD	[id number]	[facility]  C	N	O	10/17/2023	10/18/2023	oxycodone-acetaminophen 5-325 mg tab	40	7	Karan Rangel DPM	[id number]	Insurance	[facility]    * - Details of Drug Type : O = Opioid, B = Benzodiazepine, S = Stimulant    * - Drugs marked with an asterisk are compound drugs. If the compound drug is made up of more than one controlled substance, then each controlled substance will be a separate row in the table.

## 2023-12-21 NOTE — DISCHARGE NOTE PROVIDER - NSDCMRMEDTOKEN_GEN_ALL_CORE_FT
aspirin 325 mg oral tablet: 1 tab(s) orally 2 times a day for blood clot prevention for 15 days. Please START THIS MEDICATION AFTER FINISHING COURSE OF XARELTO.  ferrous sulfate 324 mg (65 mg elemental iron) oral tablet: 1 tab(s) orally 2 times a day  gabapentin 100 mg oral tablet: 1 tab(s) orally every 8 hours as needed for  mild pain  pantoprazole 40 mg oral delayed release tablet: 1 tab(s) orally once a day as needed for  heartburn  Percocet 7.5 mg-325 mg oral tablet: 1 tab(s) orally every 6 hours as needed for  severe pain  polyethylene glycol 3350 oral powder for reconstitution: 17 gram(s) orally once a day as needed for  constipation  senna (sennosides) 8.6 mg oral tablet: 1 tab(s) orally every 12 hours as needed for  constipation  Xarelto 10 mg oral tablet: 1 tab(s) orally once a day for blood clot prevention for 12 days. PLEASE START ASPIRIN MEDICATION WHEN FINISHED WITH 12 DAYS OF  XARELTO

## 2023-12-21 NOTE — PROGRESS NOTE ADULT - SUBJECTIVE AND OBJECTIVE BOX
Source of information: QASIM KAUR, Chart review  Patient language: English  : n/a    HPI:  68year old male with pmhx of ex-cigarette smoker, prostate cancer, right knee meniscus tear, right foot bone spur and bilateral ankle and knee OA presents with c/o intermittent right knee pain x 29years that has failed to resolve with conservative management and 2 arthroscopic surgeries. Patient is here today for presurgical testing for scheduled Right Total Knee Replacement on 2023 (11 Dec 2023 13:45)    Pt is admitted for elective right total knee replacement. Pt is S/p Right total knee replacement on 23. POD#2. Pt seen and examined at bedside this morning, found lying in bed, awake, alert and oriented x 3. Reports pain score 8/10 patient encouraged to request PRN pain medication. SCALE USED: (1-10 VNRS). Pt describes pain as localized to right knee and right thigh, aching and throbbing in quality. Pain is alleviated by pain medication and ice application and is exacerbated by movement and palpation. Pt denies lethargy, chest pain, SOB, nausea, vomiting, constipation. Reports last BM . Patient stated goal for pain control: to be able to take deep breaths, get out of bed to chair and ambulate with tolerable pain control. Pt reports taking Tylenol and Naproxen for pain at home. Pt states that he has a rolling walker that was shipped to his home. Pt. had an RRT  for syncopal episode while with PT, patient was administered 1L fluid bolus with return to baseline status, cardiac monitor on.     PAST MEDICAL & SURGICAL HISTORY:  Pinched nerve    OA (osteoarthritis)    Torn meniscus    OA (osteoarthritis) of ankle    Ex-cigarette smoker    H/O arthroscopy of knee    History of ankle surgery    History of foot surgery    FAMILY HISTORY:    Social History:   [x]Denies ETOH use, illicit drug use, and smoking     Allergies    sulfa drugs (Hives)    Intolerances      MEDICATIONS  (STANDING):  acetaminophen     Tablet .. 975 milliGRAM(s) Oral every 6 hours  chlorhexidine 2% Cloths 1 Application(s) Topical once  enoxaparin Injectable 30 milliGRAM(s) SubCutaneous every 12 hours  ferrous    sulfate 325 milliGRAM(s) Oral daily  pantoprazole    Tablet 40 milliGRAM(s) Oral before breakfast  polyethylene glycol 3350 17 Gram(s) Oral at bedtime  senna 2 Tablet(s) Oral at bedtime  sodium chloride 0.9% lock flush 3 milliLiter(s) IV Push every 8 hours  sodium chloride 0.9%. 1000 milliLiter(s) (110 mL/Hr) IV Continuous <Continuous>  traMADol 50 milliGRAM(s) Oral every 8 hours    MEDICATIONS  (PRN):  ketorolac   Injectable 15 milliGRAM(s) IV Push every 6 hours PRN Severe Pain (7 - 10)  magnesium hydroxide Suspension 30 milliLiter(s) Oral daily PRN Constipation  ondansetron Injectable 4 milliGRAM(s) IV Push every 6 hours PRN Nausea and/or Vomiting  oxyCODONE    IR 5 milliGRAM(s) Oral every 4 hours PRN Moderate Pain (4 - 6)      Vital Signs Last 24 Hrs  T(C): 37.1 (21 Dec 2023 05:20), Max: 38.7 (20 Dec 2023 21:43)  T(F): 98.8 (21 Dec 2023 05:20), Max: 101.7 (20 Dec 2023 21:43)  HR: 73 (21 Dec 2023 05:20) (72 - 96)  BP: 141/72 (21 Dec 2023 05:20) (127/55 - 162/95)  BP(mean): 117 (20 Dec 2023 12:52) (105 - 117)  RR: 18 (21 Dec 2023 05:20) (18 - 18)  SpO2: 96% (21 Dec 2023 05:20) (96% - 97%)    Parameters below as of 21 Dec 2023 05:20  Patient On (Oxygen Delivery Method): room air        LABS: Reviewed                          9.1    7.95  )-----------( 271      ( 21 Dec 2023 06:48 )             30.7     12-    138  |  106  |  9   ----------------------------<  105<H>  3.9   |  28  |  0.87    Ca    8.0<L>      21 Dec 2023 06:48  Phos  2.9     12-20  Mg     1.9     12-20    TPro  7.3  /  Alb  2.4<L>  /  TBili  0.5  /  DBili  x   /  AST  12  /  ALT  15  /  AlkPhos  92  12-20    LIVER FUNCTIONS - ( 20 Dec 2023 12:32 )  Alb: 2.4 g/dL / Pro: 7.3 g/dL / ALK PHOS: 92 U/L / ALT: 15 U/L DA / AST: 12 U/L / GGT: x           Urinalysis Basic - ( 21 Dec 2023 10:00 )    Color: Yellow / Appearance: Cloudy / S.019 / pH: x  Gluc: x / Ketone: Trace mg/dL  / Bili: Negative / Urobili: 0.2 mg/dL   Blood: x / Protein: Negative mg/dL / Nitrite: Negative   Leuk Esterase: Negative / RBC: x / WBC x   Sq Epi: x / Non Sq Epi: x / Bacteria: x    CAPILLARY BLOOD GLUCOSE    POCT Blood Glucose.: 171 mg/dL (20 Dec 2023 12:16)    Radiology: Reviewed  < from: Xray Knee 1 or 2 Views, Right (23 @ 19:00) >    ACC: [id number] EXAM:  XR KNEE 1-2 VIEWS RT   ORDERED BY: VINNIE MARION     PROCEDURE DATE:  2023      INTERPRETATION:  HISTORY: Postoperative  knee replacement.    TECHNIQUE: Two views of the RIGHT knee are submitted.    FINDINGS: Status post tricompartmental knee replacement with the femoral,   tibial and patellar components in anatomic alignment.  There is no fracture .    IMPRESSION:  Knee prosthetic components in proper anatomical alignment.    --- End of Report ---    EVELYN DIOP MD; Attending Radiologist  This document has been electronically signed. Dec 20 2023  1:58PM    < end of copied text >    ORT Score -   Family Hx of substance abuse	Female	      Male  Alcohol 	                                           1                     3  Illegal drugs	                                   2                     3  Rx drugs                                           4 	                  4  Personal Hx of substance abuse		  Alcohol 	                                          3	                  3  Illegal drugs                                     4	                  4  Rx drugs                                            5 	                  5  Age between 16- 45 years	           1                     1  hx preadolescent sexual abuse	   3 	                  0  Psychological disease		  ADD, OCD, bipolar, schizophrenia   2	          2  Depression                                           1 	          1  Total: 0    a score of 3 or lower indicates low risk for opioid abuse		  a score of 4-7 indicates moderate risk for opioid abuse		  a score of 8 or higher indicates high risk for opioid abuse    REVIEW OF SYSTEMS:  CONSTITUTIONAL: No fever or fatigue  HEENT:  No difficulty hearing, no change in vision  NECK: No pain or stiffness  RESPIRATORY: No cough, wheezing, chills or hemoptysis; No shortness of breath  CARDIOVASCULAR: No chest pain, palpitations, dizziness, or leg swelling  GASTROINTESTINAL: No loss of appetite, decreased PO intake. No abdominal or epigastric pain. No nausea, vomiting; No diarrhea or constipation.   GENITOURINARY: No dysuria, frequency, hematuria, retention or incontinence  MUSCULOSKELETAL: + Right knee pain and swelling; No back pain, no upper or left lower motor strength weakness, + RLE motor weakness due to pain, no saddle anesthesia, bowel/bladder incontinence, no falls   NEURO: No headaches, No numbness/tingling b/l LE  ENDOCRINE: No polyuria, polydipsia, heat or cold intolerance; No hair loss  PSYCHIATRIC: No depression, anxiety or difficulty sleeping    PHYSICAL EXAM:  GENERAL:  Alert & Oriented X4, cooperative, NAD, Good concentration. Speech is clear.   RESPIRATORY: Respirations even and unlabored. Clear to auscultation bilaterally  CARDIOVASCULAR: Normal S1/S2, regular rate and rhythm  GASTROINTESTINAL:  Soft, Nontender, Nondistended; Bowel sounds present  PERIPHERAL VASCULAR:  Extremities warm, + RLE edema secondary to surgery. 2+ Peripheral Pulses, No cyanosis, No calf tenderness  MUSCULOSKELETAL: Motor Strength 5/5 B/L upper and left lower extremities; RLE motor strength 3/5 due to pain, decreased ROM to RLE due to pain;  + tenderness on palpation of R knee   SKIN: Warm, dry, Right knee dressing in place c/d/i     Risk factors associated with adverse outcomes related to opioid treatment  [ ]  Concurrent benzodiazepine use  [ ]  History/ Active substance use or alcohol use disorder  [ ] Psychiatric co-morbidity  [ ] Sleep apnea  [ ] COPD  [ ] BMI> 35  [ ] Liver dysfunction  [ ] Renal dysfunction  [ ] CHF  [x] Former Smoker  [x]  Age > 60 years    [x]  [place]  Reviewed and Copied to Chart. See below.    Plan of care and goal oriented pain management treatment options were discussed with patient and /or primary care giver; all questions and concerns were addressed and care was aligned with patient's wishes.    Educated patient on goal oriented pain management treatment options        Source of information: QASIM KAUR, Chart review  Patient language: English  : n/a    HPI:  68year old male with pmhx of ex-cigarette smoker, prostate cancer, right knee meniscus tear, right foot bone spur and bilateral ankle and knee OA presents with c/o intermittent right knee pain x 29years that has failed to resolve with conservative management and 2 arthroscopic surgeries. Patient is here today for presurgical testing for scheduled Right Total Knee Replacement on 2023 (11 Dec 2023 13:45)    Pt is admitted for elective right total knee replacement. Pt is S/p Right total knee replacement on 23. POD#2. Pt seen and examined at bedside this morning, found lying in bed, awake, alert and oriented x 3. Reports pain score 8/10 patient encouraged to request PRN pain medication. SCALE USED: (1-10 VNRS). Pt describes pain as localized to right knee and right thigh, aching and throbbing in quality. Pain is alleviated by pain medication and ice application and is exacerbated by movement and palpation. Pt denies lethargy, chest pain, SOB, nausea, vomiting, constipation. Reports last BM . Patient stated goal for pain control: to be able to take deep breaths, get out of bed to chair and ambulate with tolerable pain control. Pt reports taking Tylenol and Naproxen for pain at home. Pt states that he has a rolling walker that was shipped to his home. Pt. had an RRT  for syncopal episode while with PT, patient was administered 1L fluid bolus with return to baseline status, cardiac monitor on.     PAST MEDICAL & SURGICAL HISTORY:  Pinched nerve    OA (osteoarthritis)    Torn meniscus    OA (osteoarthritis) of ankle    Ex-cigarette smoker    H/O arthroscopy of knee    History of ankle surgery    History of foot surgery    FAMILY HISTORY:    Social History:   [x]Denies ETOH use, illicit drug use, and smoking     Allergies    sulfa drugs (Hives)    Intolerances      MEDICATIONS  (STANDING):  acetaminophen     Tablet .. 975 milliGRAM(s) Oral every 6 hours  chlorhexidine 2% Cloths 1 Application(s) Topical once  enoxaparin Injectable 30 milliGRAM(s) SubCutaneous every 12 hours  ferrous    sulfate 325 milliGRAM(s) Oral daily  pantoprazole    Tablet 40 milliGRAM(s) Oral before breakfast  polyethylene glycol 3350 17 Gram(s) Oral at bedtime  senna 2 Tablet(s) Oral at bedtime  sodium chloride 0.9% lock flush 3 milliLiter(s) IV Push every 8 hours  sodium chloride 0.9%. 1000 milliLiter(s) (110 mL/Hr) IV Continuous <Continuous>  traMADol 50 milliGRAM(s) Oral every 8 hours    MEDICATIONS  (PRN):  ketorolac   Injectable 15 milliGRAM(s) IV Push every 6 hours PRN Severe Pain (7 - 10)  magnesium hydroxide Suspension 30 milliLiter(s) Oral daily PRN Constipation  ondansetron Injectable 4 milliGRAM(s) IV Push every 6 hours PRN Nausea and/or Vomiting  oxyCODONE    IR 5 milliGRAM(s) Oral every 4 hours PRN Moderate Pain (4 - 6)      Vital Signs Last 24 Hrs  T(C): 37.1 (21 Dec 2023 05:20), Max: 38.7 (20 Dec 2023 21:43)  T(F): 98.8 (21 Dec 2023 05:20), Max: 101.7 (20 Dec 2023 21:43)  HR: 73 (21 Dec 2023 05:20) (72 - 96)  BP: 141/72 (21 Dec 2023 05:20) (127/55 - 162/95)  BP(mean): 117 (20 Dec 2023 12:52) (105 - 117)  RR: 18 (21 Dec 2023 05:20) (18 - 18)  SpO2: 96% (21 Dec 2023 05:20) (96% - 97%)    Parameters below as of 21 Dec 2023 05:20  Patient On (Oxygen Delivery Method): room air        LABS: Reviewed                          9.1    7.95  )-----------( 271      ( 21 Dec 2023 06:48 )             30.7     12-    138  |  106  |  9   ----------------------------<  105<H>  3.9   |  28  |  0.87    Ca    8.0<L>      21 Dec 2023 06:48  Phos  2.9     12-20  Mg     1.9     12-20    TPro  7.3  /  Alb  2.4<L>  /  TBili  0.5  /  DBili  x   /  AST  12  /  ALT  15  /  AlkPhos  92  12-20    LIVER FUNCTIONS - ( 20 Dec 2023 12:32 )  Alb: 2.4 g/dL / Pro: 7.3 g/dL / ALK PHOS: 92 U/L / ALT: 15 U/L DA / AST: 12 U/L / GGT: x           Urinalysis Basic - ( 21 Dec 2023 10:00 )    Color: Yellow / Appearance: Cloudy / S.019 / pH: x  Gluc: x / Ketone: Trace mg/dL  / Bili: Negative / Urobili: 0.2 mg/dL   Blood: x / Protein: Negative mg/dL / Nitrite: Negative   Leuk Esterase: Negative / RBC: x / WBC x   Sq Epi: x / Non Sq Epi: x / Bacteria: x    CAPILLARY BLOOD GLUCOSE    POCT Blood Glucose.: 171 mg/dL (20 Dec 2023 12:16)    Radiology: Reviewed  < from: Xray Knee 1 or 2 Views, Right (23 @ 19:00) >    ACC: [id number] EXAM:  XR KNEE 1-2 VIEWS RT   ORDERED BY: VINNIE MARION     PROCEDURE DATE:  2023      INTERPRETATION:  HISTORY: Postoperative  knee replacement.    TECHNIQUE: Two views of the RIGHT knee are submitted.    FINDINGS: Status post tricompartmental knee replacement with the femoral,   tibial and patellar components in anatomic alignment.  There is no fracture .    IMPRESSION:  Knee prosthetic components in proper anatomical alignment.    --- End of Report ---    EVELYN DIOP MD; Attending Radiologist  This document has been electronically signed. Dec 20 2023  1:58PM    < end of copied text >    ORT Score -   Family Hx of substance abuse	Female	      Male  Alcohol 	                                           1                     3  Illegal drugs	                                   2                     3  Rx drugs                                           4 	                  4  Personal Hx of substance abuse		  Alcohol 	                                          3	                  3  Illegal drugs                                     4	                  4  Rx drugs                                            5 	                  5  Age between 16- 45 years	           1                     1  hx preadolescent sexual abuse	   3 	                  0  Psychological disease		  ADD, OCD, bipolar, schizophrenia   2	          2  Depression                                           1 	          1  Total: 0    a score of 3 or lower indicates low risk for opioid abuse		  a score of 4-7 indicates moderate risk for opioid abuse		  a score of 8 or higher indicates high risk for opioid abuse    REVIEW OF SYSTEMS:  CONSTITUTIONAL: No fever or fatigue  HEENT:  No difficulty hearing, no change in vision  NECK: No pain or stiffness  RESPIRATORY: No cough, wheezing, chills or hemoptysis; No shortness of breath  CARDIOVASCULAR: No chest pain, palpitations, dizziness, or leg swelling  GASTROINTESTINAL: No loss of appetite, decreased PO intake. No abdominal or epigastric pain. No nausea, vomiting; No diarrhea or constipation.   GENITOURINARY: No dysuria, frequency, hematuria, retention or incontinence  MUSCULOSKELETAL: + Right knee pain and swelling; No back pain, no upper or left lower motor strength weakness, + RLE motor weakness due to pain, no saddle anesthesia, bowel/bladder incontinence, no falls   NEURO: No headaches, No numbness/tingling b/l LE  ENDOCRINE: No polyuria, polydipsia, heat or cold intolerance; No hair loss  PSYCHIATRIC: No depression, anxiety or difficulty sleeping    PHYSICAL EXAM:  GENERAL:  Alert & Oriented X4, cooperative, NAD, Good concentration. Speech is clear.   RESPIRATORY: Respirations even and unlabored. Clear to auscultation bilaterally  CARDIOVASCULAR: Normal S1/S2, regular rate and rhythm  GASTROINTESTINAL:  Soft, Nontender, Nondistended; Bowel sounds present  PERIPHERAL VASCULAR:  Extremities warm, + RLE edema secondary to surgery. 2+ Peripheral Pulses, No cyanosis, No calf tenderness  MUSCULOSKELETAL: Motor Strength 5/5 B/L upper and left lower extremities; RLE motor strength 3/5 due to pain, decreased ROM to RLE due to pain;  + tenderness on palpation of R knee   SKIN: Warm, dry, Right knee dressing in place c/d/i     Risk factors associated with adverse outcomes related to opioid treatment  [ ]  Concurrent benzodiazepine use  [ ]  History/ Active substance use or alcohol use disorder  [ ] Psychiatric co-morbidity  [ ] Sleep apnea  [ ] COPD  [ ] BMI> 35  [ ] Liver dysfunction  [ ] Renal dysfunction  [ ] CHF  [x] Former Smoker  [x]  Age > 60 years    [x]  [place]  Reviewed and Copied to Chart. See below.    Plan of care and goal oriented pain management treatment options were discussed with patient and /or primary care giver; all questions and concerns were addressed and care was aligned with patient's wishes.    Educated patient on goal oriented pain management treatment options

## 2023-12-21 NOTE — PROGRESS NOTE ADULT - NS ATTEND AMEND GEN_ALL_CORE FT
I have seen and evaluated the patient at the bedside along with the internal medicine resident.     He feels much better than the prior day. He had a fever yesterday evening to 101.7 F. He says he did not feel the fever, no rigors/chills. He has no symptoms today besides lingering right knee pain and swelling, feels "tight and stretched."    On exam, he is laying in bed in no acute distress. No diaphoresis. Neuro exam unremarkable, no focal deficits. Cardiac exam demonstrates regular rate and rhythm without murmurs, rubs or gallops. Lungs are clear to auscultation. Right knee is swollen, warm, erythematous and tender.     I have reviewed his BMP and CBC. Leukocytosis has resolved, from 11 down to 7.9. Hgb stable at 9.1 g/dL. Renal function stable.     Assessment and Plan:    68M with pmhx of ex-cigarette smoker, prostate cancer, right knee meniscus tear, right foot bone spur and bilateral ankle and knee OA presents with c/o intermittent right knee pain s/p Right Total Knee Replacement on 12/19/2023 had RRT for a near syncopal episode 2/2 vasovagal vs orthostasis.    As above, suspect the event was triggered by a combination of factors, including orthostatic hypotension and pain stimulus. He had little to no oral intake, solids or liquids, the day prior to surgery, the day of surgery, and the morning of 12/20 prior to participating in PT. BP reliably improved with bolus of crystalloid fluids. Will monitor patient on tele for next 24 hours but do not have strong suspicion for structural heart disease so will defer TTE for now.     Suspect fever is postoperative/reactive in nature - UA and CXR unremarkable. Will keep monitoring.    #Vasovagal Syncope vs Orthostatic Hypotension, Resolved  #Hypotension, improved with IV fluids  #Severe Right Knee OA s/p total arthroplasty on 12/19  #Hx of prostate cancer    -monitor on Tele, can DC PM 12/21  -encourage oral hydration  -ok to defer TTE  -12 lead ECG  -trend lactic acid (resolved)   -rest of care per surgery team  -monitor fever curve

## 2023-12-21 NOTE — PROGRESS NOTE ADULT - ASSESSMENT
68M with pmhx of ex-cigarette smoker, prostate cancer, right knee meniscus tear, right foot bone spur and bilateral ankle and knee OA presents with c/o intermittent right knee pain s/p Right Total Knee Replacement on 12/19/2023 had RRT for a near syncopal episode 2/2 vasovagal vs orthostasis    #Fever:  Patient febrile overnight to 101.7  Likely post-operative fever  Would recommend to hold off on antibiotics for now  Would order blood cultures if patient is febrile again, however, at the moment would hold off    #Syncopal Episode  Patient states having nausea and diaphoresis prior to episode  Likely vasovagal  s/p 1L in RRT, with improvement in symptoms  Place on telemetry for 24hours to monitor for any episodes of arrythmia  Patient denies additional symptoms.    #Lactic acidosis   Resolved  Patient with elevated lactate 2.6 > 1.2  likely in setting of hypotension      #R total knee replacement  POD#2  care per Ortho team

## 2023-12-21 NOTE — DISCHARGE NOTE PROVIDER - CARE PROVIDERS DIRECT ADDRESSES
,[email or web address]

## 2023-12-21 NOTE — DISCHARGE NOTE PROVIDER - NSDCCPGOAL_GEN_ALL_CORE_FT
To get better and follow your care plan as instructed. no loss of consciousness, no gait abnormality, no headache, no sensory deficits, and no weakness.

## 2023-12-22 ENCOUNTER — TRANSCRIPTION ENCOUNTER (OUTPATIENT)
Age: 68
End: 2023-12-22

## 2023-12-22 VITALS
TEMPERATURE: 98 F | HEART RATE: 85 BPM | OXYGEN SATURATION: 98 % | DIASTOLIC BLOOD PRESSURE: 96 MMHG | RESPIRATION RATE: 18 BRPM | SYSTOLIC BLOOD PRESSURE: 160 MMHG

## 2023-12-22 LAB
ANION GAP SERPL CALC-SCNC: 5 MMOL/L — SIGNIFICANT CHANGE UP (ref 5–17)
ANION GAP SERPL CALC-SCNC: 5 MMOL/L — SIGNIFICANT CHANGE UP (ref 5–17)
BUN SERPL-MCNC: 9 MG/DL — SIGNIFICANT CHANGE UP (ref 7–18)
BUN SERPL-MCNC: 9 MG/DL — SIGNIFICANT CHANGE UP (ref 7–18)
CALCIUM SERPL-MCNC: 8.3 MG/DL — LOW (ref 8.4–10.5)
CALCIUM SERPL-MCNC: 8.3 MG/DL — LOW (ref 8.4–10.5)
CHLORIDE SERPL-SCNC: 104 MMOL/L — SIGNIFICANT CHANGE UP (ref 96–108)
CHLORIDE SERPL-SCNC: 104 MMOL/L — SIGNIFICANT CHANGE UP (ref 96–108)
CO2 SERPL-SCNC: 30 MMOL/L — SIGNIFICANT CHANGE UP (ref 22–31)
CO2 SERPL-SCNC: 30 MMOL/L — SIGNIFICANT CHANGE UP (ref 22–31)
CREAT SERPL-MCNC: 0.85 MG/DL — SIGNIFICANT CHANGE UP (ref 0.5–1.3)
CREAT SERPL-MCNC: 0.85 MG/DL — SIGNIFICANT CHANGE UP (ref 0.5–1.3)
EGFR: 94 ML/MIN/1.73M2 — SIGNIFICANT CHANGE UP
EGFR: 94 ML/MIN/1.73M2 — SIGNIFICANT CHANGE UP
GLUCOSE SERPL-MCNC: 97 MG/DL — SIGNIFICANT CHANGE UP (ref 70–99)
GLUCOSE SERPL-MCNC: 97 MG/DL — SIGNIFICANT CHANGE UP (ref 70–99)
HCT VFR BLD CALC: 26.8 % — LOW (ref 39–50)
HCT VFR BLD CALC: 26.8 % — LOW (ref 39–50)
HGB BLD-MCNC: 8.1 G/DL — LOW (ref 13–17)
HGB BLD-MCNC: 8.1 G/DL — LOW (ref 13–17)
MCHC RBC-ENTMCNC: 24.2 PG — LOW (ref 27–34)
MCHC RBC-ENTMCNC: 24.2 PG — LOW (ref 27–34)
MCHC RBC-ENTMCNC: 30.2 GM/DL — LOW (ref 32–36)
MCHC RBC-ENTMCNC: 30.2 GM/DL — LOW (ref 32–36)
MCV RBC AUTO: 80 FL — SIGNIFICANT CHANGE UP (ref 80–100)
MCV RBC AUTO: 80 FL — SIGNIFICANT CHANGE UP (ref 80–100)
NRBC # BLD: 0 /100 WBCS — SIGNIFICANT CHANGE UP (ref 0–0)
NRBC # BLD: 0 /100 WBCS — SIGNIFICANT CHANGE UP (ref 0–0)
PLATELET # BLD AUTO: 271 K/UL — SIGNIFICANT CHANGE UP (ref 150–400)
PLATELET # BLD AUTO: 271 K/UL — SIGNIFICANT CHANGE UP (ref 150–400)
POTASSIUM SERPL-MCNC: 3.7 MMOL/L — SIGNIFICANT CHANGE UP (ref 3.5–5.3)
POTASSIUM SERPL-MCNC: 3.7 MMOL/L — SIGNIFICANT CHANGE UP (ref 3.5–5.3)
POTASSIUM SERPL-SCNC: 3.7 MMOL/L — SIGNIFICANT CHANGE UP (ref 3.5–5.3)
POTASSIUM SERPL-SCNC: 3.7 MMOL/L — SIGNIFICANT CHANGE UP (ref 3.5–5.3)
RBC # BLD: 3.35 M/UL — LOW (ref 4.2–5.8)
RBC # BLD: 3.35 M/UL — LOW (ref 4.2–5.8)
RBC # FLD: 14.4 % — SIGNIFICANT CHANGE UP (ref 10.3–14.5)
RBC # FLD: 14.4 % — SIGNIFICANT CHANGE UP (ref 10.3–14.5)
SODIUM SERPL-SCNC: 139 MMOL/L — SIGNIFICANT CHANGE UP (ref 135–145)
SODIUM SERPL-SCNC: 139 MMOL/L — SIGNIFICANT CHANGE UP (ref 135–145)
SURGICAL PATHOLOGY STUDY: SIGNIFICANT CHANGE UP
SURGICAL PATHOLOGY STUDY: SIGNIFICANT CHANGE UP
WBC # BLD: 7.41 K/UL — SIGNIFICANT CHANGE UP (ref 3.8–10.5)
WBC # BLD: 7.41 K/UL — SIGNIFICANT CHANGE UP (ref 3.8–10.5)
WBC # FLD AUTO: 7.41 K/UL — SIGNIFICANT CHANGE UP (ref 3.8–10.5)
WBC # FLD AUTO: 7.41 K/UL — SIGNIFICANT CHANGE UP (ref 3.8–10.5)

## 2023-12-22 PROCEDURE — 85027 COMPLETE CBC AUTOMATED: CPT

## 2023-12-22 PROCEDURE — 97530 THERAPEUTIC ACTIVITIES: CPT

## 2023-12-22 PROCEDURE — 86900 BLOOD TYPING SEROLOGIC ABO: CPT

## 2023-12-22 PROCEDURE — 93970 EXTREMITY STUDY: CPT | Mod: 26

## 2023-12-22 PROCEDURE — 80053 COMPREHEN METABOLIC PANEL: CPT

## 2023-12-22 PROCEDURE — 93005 ELECTROCARDIOGRAM TRACING: CPT

## 2023-12-22 PROCEDURE — 80048 BASIC METABOLIC PNL TOTAL CA: CPT

## 2023-12-22 PROCEDURE — 82962 GLUCOSE BLOOD TEST: CPT

## 2023-12-22 PROCEDURE — 97116 GAIT TRAINING THERAPY: CPT

## 2023-12-22 PROCEDURE — C1776: CPT

## 2023-12-22 PROCEDURE — 86901 BLOOD TYPING SEROLOGIC RH(D): CPT

## 2023-12-22 PROCEDURE — 71045 X-RAY EXAM CHEST 1 VIEW: CPT

## 2023-12-22 PROCEDURE — 81001 URINALYSIS AUTO W/SCOPE: CPT

## 2023-12-22 PROCEDURE — 82550 ASSAY OF CK (CPK): CPT

## 2023-12-22 PROCEDURE — 82553 CREATINE MB FRACTION: CPT

## 2023-12-22 PROCEDURE — 84100 ASSAY OF PHOSPHORUS: CPT

## 2023-12-22 PROCEDURE — 88311 DECALCIFY TISSUE: CPT

## 2023-12-22 PROCEDURE — 73560 X-RAY EXAM OF KNEE 1 OR 2: CPT

## 2023-12-22 PROCEDURE — 99232 SBSQ HOSP IP/OBS MODERATE 35: CPT

## 2023-12-22 PROCEDURE — 97162 PT EVAL MOD COMPLEX 30 MIN: CPT

## 2023-12-22 PROCEDURE — C1713: CPT

## 2023-12-22 PROCEDURE — 88305 TISSUE EXAM BY PATHOLOGIST: CPT

## 2023-12-22 PROCEDURE — 84484 ASSAY OF TROPONIN QUANT: CPT

## 2023-12-22 PROCEDURE — 83735 ASSAY OF MAGNESIUM: CPT

## 2023-12-22 PROCEDURE — 99232 SBSQ HOSP IP/OBS MODERATE 35: CPT | Mod: GC

## 2023-12-22 PROCEDURE — 93970 EXTREMITY STUDY: CPT

## 2023-12-22 PROCEDURE — 83605 ASSAY OF LACTIC ACID: CPT

## 2023-12-22 PROCEDURE — 36415 COLL VENOUS BLD VENIPUNCTURE: CPT

## 2023-12-22 PROCEDURE — 86850 RBC ANTIBODY SCREEN: CPT

## 2023-12-22 RX ORDER — ACETAMINOPHEN 500 MG
1000 TABLET ORAL EVERY 8 HOURS
Refills: 0 | Status: DISCONTINUED | OUTPATIENT
Start: 2023-12-22 | End: 2023-12-22

## 2023-12-22 RX ADMIN — OXYCODONE HYDROCHLORIDE 5 MILLIGRAM(S): 5 TABLET ORAL at 11:18

## 2023-12-22 RX ADMIN — PANTOPRAZOLE SODIUM 40 MILLIGRAM(S): 20 TABLET, DELAYED RELEASE ORAL at 06:06

## 2023-12-22 RX ADMIN — Medication 975 MILLIGRAM(S): at 06:06

## 2023-12-22 RX ADMIN — Medication 15 MILLIGRAM(S): at 11:17

## 2023-12-22 RX ADMIN — SODIUM CHLORIDE 3 MILLILITER(S): 9 INJECTION INTRAMUSCULAR; INTRAVENOUS; SUBCUTANEOUS at 05:53

## 2023-12-22 RX ADMIN — ENOXAPARIN SODIUM 30 MILLIGRAM(S): 100 INJECTION SUBCUTANEOUS at 14:22

## 2023-12-22 RX ADMIN — TRAMADOL HYDROCHLORIDE 50 MILLIGRAM(S): 50 TABLET ORAL at 06:05

## 2023-12-22 RX ADMIN — TRAMADOL HYDROCHLORIDE 50 MILLIGRAM(S): 50 TABLET ORAL at 14:22

## 2023-12-22 RX ADMIN — Medication 1000 MILLIGRAM(S): at 14:22

## 2023-12-22 NOTE — PROGRESS NOTE ADULT - ASSESSMENT
68M with pmhx of ex-cigarette smoker, prostate cancer, right knee meniscus tear, right foot bone spur and bilateral ankle and knee OA presents with c/o intermittent right knee pain s/p Right Total Knee Replacement on 12/19/2023 had RRT for a near syncopal episode 2/2 vasovagal vs orthostasis    #Fever:  Patient febrile overnight to 100.5  Likely post-operative fever  Would recommend to hold off on antibiotics for now  If febrile again would order blood culture    #Syncopal Episode  Patient states having nausea and diaphoresis prior to episode  Likely vasovagal  s/p 1L in RRT, with improvement in symptoms  Place on telemetry for 24hours to monitor for any episodes of arrythmia  Patient denies additional symptoms.    #Lactic acidosis   Resolved  Patient with elevated lactate 2.6 > 1.2  likely in setting of hypotension      #R total knee replacement  POD#3  care per Ortho team    Medicine team will sign off, please reconsult PRN 68M with pmhx of ex-cigarette smoker, prostate cancer, right knee meniscus tear, right foot bone spur and bilateral ankle and knee OA presents with c/o intermittent right knee pain s/p Right Total Knee Replacement on 12/19/2023 had RRT for a near syncopal episode 2/2 vasovagal vs orthostasis    #Fever:  Patient febrile overnight to 100.5  Likely post-operative fever  Would recommend to hold off on antibiotics for now  If febrile again would order blood culture    #Syncopal Episode  Patient states having nausea and diaphoresis prior to episode  Likely vasovagal  s/p 1L in RRT, with improvement in symptoms  no evidence of arrythmia on tele  Would DC tele  Patient denies additional symptoms.    #Lactic acidosis   Resolved  Patient with elevated lactate 2.6 > 1.2  likely in setting of hypotension      #R total knee replacement  POD#3  care per Ortho team    Medicine team will sign off, please reconsult PRN

## 2023-12-22 NOTE — PROGRESS NOTE ADULT - ATTENDING COMMENTS
I have seen and evaluated the patient at the bedside and discussed his case with the resident physician.     Patient was again febrile last evening to 100.9 F. No other new symptoms.     On exam, he is laying in bed in no acute distress. No diaphoresis. Neuro exam unremarkable, no focal deficits. Cardiac exam demonstrates regular rate and rhythm without murmurs, rubs or gallops. Lungs are clear to auscultation. Right knee is moderately swollen    I have reviewed his BMP and CBC. No leukocytosis. Hgb has declined a full point to 8.1 g/dL from 9.1 g/dL. Renal function remains normal.     Assessment and Plan:    68M with pmhx of ex-cigarette smoker, prostate cancer, right knee meniscus tear, right foot bone spur and bilateral ankle and knee OA presents with c/o intermittent right knee pain s/p Right Total Knee Replacement on 12/19/2023 had RRT for a near syncopal episode 2/2 vasovagal vs orthostasis.    As above, suspect the event was triggered by a combination of factors, including orthostatic hypotension and pain stimulus. He had little to no oral intake, solids or liquids, the day prior to surgery, the day of surgery, and the morning of 12/20 prior to participating in PT. BP reliably improved with bolus of crystalloid fluids. Ok to stop tele now    Suspect fever is postoperative/reactive in nature - UA and CXR unremarkable. Will keep monitoring.    #Vasovagal Syncope vs Orthostatic Hypotension, Resolved  #Hypotension, improved with IV fluids  #Severe Right Knee OA s/p total arthroplasty on 12/19  #Postoperative Fever  #Hx of prostate cancer    -from IM perspective, ok to DC telemetry monitoring   -encourage oral hydration  -ok to defer TTE  -12 lead ECG  -trend lactic acid (resolved)   -rest of care per surgery team  -monitor fever curve.  -UA negative  -CXR negative  -can re blood culture but suspect yield to be low

## 2023-12-22 NOTE — PROGRESS NOTE ADULT - SUBJECTIVE AND OBJECTIVE BOX
Source of information: QASIM KAUR, Chart review  Patient language: English  : n/a    HPI:  68year old male with pmhx of ex-cigarette smoker, prostate cancer, right knee meniscus tear, right foot bone spur and bilateral ankle and knee OA presents with c/o intermittent right knee pain x 29years that has failed to resolve with conservative management and 2 arthroscopic surgeries. Patient is here today for presurgical testing for scheduled Right Total Knee Replacement on 12/19/2023 (11 Dec 2023 13:45)    Pt is admitted for elective right total knee replacement. Pt is S/p Right total knee replacement on 12/19/23. POD#3. Pt seen and examined at bedside this morning, found OOB with walker in room, and to bathroom. Pt is alert and oriented x 3. Reports pain score 8/10 due to ambulation, patient encouraged to request PRN pain medication. SCALE USED: (1-10 VNRS). Pt describes pain as localized to right knee and right thigh, aching and throbbing in quality. Pain is alleviated by pain medication and ice application and is exacerbated by movement, ambulation and palpation. RN informed to medicate pt with pain meds as ordered. Pt denies lethargy, chest pain, SOB, nausea, vomiting, constipation. Reports last BM today 12/22. Patient stated goal for pain control: to be able to take deep breaths, get out of bed to chair and ambulate with tolerable pain control. Pt reports taking Tylenol and Naproxen for pain at home. Pt states that he has a rolling walker that was shipped to his home. Pt had an RRT 12/19 for syncopal episode while with PT, patient was administered 1L fluid bolus with return to baseline status, cardiac monitor on. Pt stating feeling much better, no dizziness or lightheadedness reported.     PAST MEDICAL & SURGICAL HISTORY:  Pinched nerve    OA (osteoarthritis)    Torn meniscus    OA (osteoarthritis) of ankle    Ex-cigarette smoker    H/O arthroscopy of knee    History of ankle surgery    History of foot surgery    FAMILY HISTORY:    Social History:   [x]Denies ETOH use, illicit drug use, and smoking     Allergies    sulfa drugs (Hives)    Intolerances    MEDICATIONS  (STANDING):  acetaminophen     Tablet .. 1000 milliGRAM(s) Oral every 8 hours  enoxaparin Injectable 30 milliGRAM(s) SubCutaneous every 12 hours  ferrous    sulfate 325 milliGRAM(s) Oral daily  pantoprazole    Tablet 40 milliGRAM(s) Oral before breakfast  polyethylene glycol 3350 17 Gram(s) Oral at bedtime  senna 2 Tablet(s) Oral at bedtime  sodium chloride 0.9% lock flush 3 milliLiter(s) IV Push every 8 hours  traMADol 50 milliGRAM(s) Oral every 8 hours    MEDICATIONS  (PRN):  ketorolac   Injectable 15 milliGRAM(s) IV Push every 6 hours PRN Severe Pain (7 - 10)  magnesium hydroxide Suspension 30 milliLiter(s) Oral daily PRN Constipation  ondansetron Injectable 4 milliGRAM(s) IV Push every 6 hours PRN Nausea and/or Vomiting  oxyCODONE    IR 5 milliGRAM(s) Oral every 4 hours PRN Moderate Pain (4 - 6)    Vital Signs Last 24 Hrs  T(C): 36.9 (22 Dec 2023 11:27), Max: 38.3 (21 Dec 2023 18:06)  T(F): 98.4 (22 Dec 2023 11:27), Max: 100.9 (21 Dec 2023 18:06)  HR: 80 (22 Dec 2023 11:45) (71 - 84)  BP: 137/75 (22 Dec 2023 11:45) (128/68 - 151/76)  BP(mean): 86 (21 Dec 2023 20:28) (86 - 86)  RR: 18 (22 Dec 2023 11:27) (18 - 20)  SpO2: 98% (22 Dec 2023 11:45) (97% - 98%)    Parameters below as of 22 Dec 2023 11:45  Patient On (Oxygen Delivery Method): room air    LABS: Reviewed                        8.1    7.41  )-----------( 271      ( 22 Dec 2023 06:32 )             26.8     12-22    139  |  104  |  9   ----------------------------<  97  3.7   |  30  |  0.85    Ca    8.3<L>      22 Dec 2023 06:32    Urinalysis Basic - ( 22 Dec 2023 06:32 )    Color: x / Appearance: x / SG: x / pH: x  Gluc: 97 mg/dL / Ketone: x  / Bili: x / Urobili: x   Blood: x / Protein: x / Nitrite: x   Leuk Esterase: x / RBC: x / WBC x   Sq Epi: x / Non Sq Epi: x / Bacteria: x    CAPILLARY BLOOD GLUCOSE    Radiology: Reviewed  ACC: [id number] EXAM:  XR KNEE 1-2 VIEWS RT   ORDERED BY: VINNIE MARION     PROCEDURE DATE:  12/19/2023      INTERPRETATION:  HISTORY: Postoperative  knee replacement.    TECHNIQUE: Two views of the RIGHT knee are submitted.    FINDINGS: Status post tricompartmental knee replacement with the femoral,   tibial and patellar components in anatomic alignment.  There is no fracture .    IMPRESSION:  Knee prosthetic components in proper anatomical alignment.    --- End of Report ---    EVELYN DIOP MD; Attending Radiologist  This document has been electronically signed. Dec 20 2023  1:58PM  ACC: [id number] EXAM:  XR CHEST PORTABLE URGENT 1V   ORDERED BY: VINNIE MARION     PROCEDURE DATE:  12/21/2023      INTERPRETATION:  AP erect chest on December 21, 2023 at 9:03 AM. Patient   has fever.    COMPARISON: None available.    Heart normal for projection.    Lungs are clear.    Old left mid clavicle fracture noted.    IMPRESSION: No acute finding. Old left clavicle fracture.    --- End of Report ---    EVELYN CRISTINA MD; Attending Radiologist  This document has been electronically signed. Dec 21 2023  1:56PM  ACC: [id number] EXAM:  US DPLX LWR EXT VEINS COMPL BI   ORDERED BY: EVELYN CASTILLO     PROCEDURE DATE:  12/22/2023          INTERPRETATION:  CLINICAL INFORMATION: s/p Right TKA. Evaluate for deep   venous thrombosis.    COMPARISON: None available.    TECHNIQUE: Duplex sonography of the BILATERAL LOWER extremity veins with   color and spectral Doppler, with and without compression.    FINDINGS:    RIGHT:  Normal compressibility of the RIGHT common femoral, femoral and popliteal   veins.  Dopplerexamination shows normal spontaneous and phasic flow.  No RIGHT calf vein thrombosis is detected.    LEFT:  Normal compressibility of the LEFT common femoral, femoral and popliteal   veins.  Doppler examination shows normal spontaneous and phasic flow.  No LEFT calf vein thrombosis is detected.    IMPRESSION:  No evidence of deep venous thrombosis in either lower extremity.    --- End of Report ---     GABRIEL PEDRO MD; Attending Radiologist  This document has been electronically signed. Dec 22 2023 12:42PM    ORT Score -   Family Hx of substance abuse	Female	      Male  Alcohol 	                                           1                     3  Illegal drugs	                                   2                     3  Rx drugs                                           4 	                  4  Personal Hx of substance abuse		  Alcohol 	                                          3	                  3  Illegal drugs                                     4	                  4  Rx drugs                                            5 	                  5  Age between 16- 45 years	           1                     1  hx preadolescent sexual abuse	   3 	                  0  Psychological disease		  ADD, OCD, bipolar, schizophrenia   2	          2  Depression                                           1 	          1  Total: 0    a score of 3 or lower indicates low risk for opioid abuse		  a score of 4-7 indicates moderate risk for opioid abuse		  a score of 8 or higher indicates high risk for opioid abuse    REVIEW OF SYSTEMS:  CONSTITUTIONAL: No fever or fatigue  HEENT:  No difficulty hearing, no change in vision  NECK: No pain or stiffness  RESPIRATORY: No cough, wheezing, chills or hemoptysis; No shortness of breath  CARDIOVASCULAR: No chest pain, palpitations, dizziness, or leg swelling  GASTROINTESTINAL: No loss of appetite, decreased PO intake. No abdominal or epigastric pain. No nausea, vomiting; No diarrhea or constipation.   GENITOURINARY: No dysuria, frequency, hematuria, retention or incontinence  MUSCULOSKELETAL: + Right knee pain and swelling; No back pain, no upper or left lower motor strength weakness, + RLE motor weakness due to pain, no saddle anesthesia, bowel/bladder incontinence, no falls   NEURO: No headaches, No numbness/tingling b/l LE  ENDOCRINE: No polyuria, polydipsia, heat or cold intolerance; No hair loss  PSYCHIATRIC: No depression, anxiety or difficulty sleeping    PHYSICAL EXAM:  GENERAL:  Alert & Oriented X4, cooperative, NAD, Good concentration. Speech is clear.   RESPIRATORY: Respirations even and unlabored. Clear to auscultation bilaterally  CARDIOVASCULAR: Normal S1/S2, regular rate and rhythm  GASTROINTESTINAL:  Soft, Nontender, Nondistended; Bowel sounds present  PERIPHERAL VASCULAR:  Extremities warm, + RLE edema secondary to surgery. 2+ Peripheral Pulses, No cyanosis, No calf tenderness  MUSCULOSKELETAL: Motor Strength 5/5 B/L upper and left lower extremities; RLE motor strength 3/5 due to pain, decreased ROM to RLE due to pain;  + tenderness on palpation of R knee   SKIN: Warm, dry, Right knee dressing in place c/d/i     Risk factors associated with adverse outcomes related to opioid treatment  [ ]  Concurrent benzodiazepine use  [ ]  History/ Active substance use or alcohol use disorder  [ ] Psychiatric co-morbidity  [ ] Sleep apnea  [ ] COPD  [ ] BMI> 35  [ ] Liver dysfunction  [ ] Renal dysfunction  [ ] CHF  [x] Former smoker  [x]  Age > 60 years    [x]  [place]  Reviewed and Copied to Chart. See below.    Plan of care and goal oriented pain management treatment options were discussed with patient and /or primary care giver; all questions and concerns were addressed and care was aligned with patient's wishes.    Educated patient on goal oriented pain management treatment options     12-22-23 @ 13:45     Source of information: QASIM KAUR, Chart review  Patient language: English  : n/a    HPI:  68year old male with pmhx of ex-cigarette smoker, prostate cancer, right knee meniscus tear, right foot bone spur and bilateral ankle and knee OA presents with c/o intermittent right knee pain x 29years that has failed to resolve with conservative management and 2 arthroscopic surgeries. Patient is here today for presurgical testing for scheduled Right Total Knee Replacement on 12/19/2023 (11 Dec 2023 13:45)    Pt is admitted for elective right total knee replacement. Pt is S/p Right total knee replacement on 12/19/23. POD#3. Pt seen and examined at bedside this morning, found OOB with walker in room, and to bathroom. Pt is alert and oriented x 3. Reports pain score 8/10 due to ambulation, patient encouraged to request PRN pain medication. SCALE USED: (1-10 VNRS). Pt describes pain as localized to right knee and right thigh, aching and throbbing in quality. Pain is alleviated by pain medication and ice application and is exacerbated by movement, ambulation and palpation. RN informed to medicate pt with pain meds as ordered. Pt denies lethargy, chest pain, SOB, nausea, vomiting, constipation. Reports last BM today 12/22. Patient stated goal for pain control: to be able to take deep breaths, get out of bed to chair and ambulate with tolerable pain control. Pt reports taking Tylenol and Naproxen for pain at home. Pt states that he has a rolling walker that was shipped to his home. Pt had an RRT 12/19 for syncopal episode while with PT, patient was administered 1L fluid bolus with return to baseline status, cardiac monitor on. Pt stating feeling much better, no dizziness or lightheadedness reported.     PAST MEDICAL & SURGICAL HISTORY:  Pinched nerve    OA (osteoarthritis)    Torn meniscus    OA (osteoarthritis) of ankle    Ex-cigarette smoker    H/O arthroscopy of knee    History of ankle surgery    History of foot surgery    FAMILY HISTORY:    Social History:   [x]Denies ETOH use, illicit drug use, and smoking     Allergies    sulfa drugs (Hives)    Intolerances    MEDICATIONS  (STANDING):  acetaminophen     Tablet .. 1000 milliGRAM(s) Oral every 8 hours  enoxaparin Injectable 30 milliGRAM(s) SubCutaneous every 12 hours  ferrous    sulfate 325 milliGRAM(s) Oral daily  pantoprazole    Tablet 40 milliGRAM(s) Oral before breakfast  polyethylene glycol 3350 17 Gram(s) Oral at bedtime  senna 2 Tablet(s) Oral at bedtime  sodium chloride 0.9% lock flush 3 milliLiter(s) IV Push every 8 hours  traMADol 50 milliGRAM(s) Oral every 8 hours    MEDICATIONS  (PRN):  ketorolac   Injectable 15 milliGRAM(s) IV Push every 6 hours PRN Severe Pain (7 - 10)  magnesium hydroxide Suspension 30 milliLiter(s) Oral daily PRN Constipation  ondansetron Injectable 4 milliGRAM(s) IV Push every 6 hours PRN Nausea and/or Vomiting  oxyCODONE    IR 5 milliGRAM(s) Oral every 4 hours PRN Moderate Pain (4 - 6)    Vital Signs Last 24 Hrs  T(C): 36.9 (22 Dec 2023 11:27), Max: 38.3 (21 Dec 2023 18:06)  T(F): 98.4 (22 Dec 2023 11:27), Max: 100.9 (21 Dec 2023 18:06)  HR: 80 (22 Dec 2023 11:45) (71 - 84)  BP: 137/75 (22 Dec 2023 11:45) (128/68 - 151/76)  BP(mean): 86 (21 Dec 2023 20:28) (86 - 86)  RR: 18 (22 Dec 2023 11:27) (18 - 20)  SpO2: 98% (22 Dec 2023 11:45) (97% - 98%)    Parameters below as of 22 Dec 2023 11:45  Patient On (Oxygen Delivery Method): room air    LABS: Reviewed                        8.1    7.41  )-----------( 271      ( 22 Dec 2023 06:32 )             26.8     12-22    139  |  104  |  9   ----------------------------<  97  3.7   |  30  |  0.85    Ca    8.3<L>      22 Dec 2023 06:32    Urinalysis Basic - ( 22 Dec 2023 06:32 )    Color: x / Appearance: x / SG: x / pH: x  Gluc: 97 mg/dL / Ketone: x  / Bili: x / Urobili: x   Blood: x / Protein: x / Nitrite: x   Leuk Esterase: x / RBC: x / WBC x   Sq Epi: x / Non Sq Epi: x / Bacteria: x    CAPILLARY BLOOD GLUCOSE    Radiology: Reviewed  ACC: [id number] EXAM:  XR KNEE 1-2 VIEWS RT   ORDERED BY: VINNIE MARION     PROCEDURE DATE:  12/19/2023      INTERPRETATION:  HISTORY: Postoperative  knee replacement.    TECHNIQUE: Two views of the RIGHT knee are submitted.    FINDINGS: Status post tricompartmental knee replacement with the femoral,   tibial and patellar components in anatomic alignment.  There is no fracture .    IMPRESSION:  Knee prosthetic components in proper anatomical alignment.    --- End of Report ---    EVELYN DOIP MD; Attending Radiologist  This document has been electronically signed. Dec 20 2023  1:58PM  ACC: [id number] EXAM:  XR CHEST PORTABLE URGENT 1V   ORDERED BY: VINNIE MARION     PROCEDURE DATE:  12/21/2023      INTERPRETATION:  AP erect chest on December 21, 2023 at 9:03 AM. Patient   has fever.    COMPARISON: None available.    Heart normal for projection.    Lungs are clear.    Old left mid clavicle fracture noted.    IMPRESSION: No acute finding. Old left clavicle fracture.    --- End of Report ---    EVELYN CRISTINA MD; Attending Radiologist  This document has been electronically signed. Dec 21 2023  1:56PM  ACC: [id number] EXAM:  US DPLX LWR EXT VEINS COMPL BI   ORDERED BY: EVELYN CASTILLO     PROCEDURE DATE:  12/22/2023          INTERPRETATION:  CLINICAL INFORMATION: s/p Right TKA. Evaluate for deep   venous thrombosis.    COMPARISON: None available.    TECHNIQUE: Duplex sonography of the BILATERAL LOWER extremity veins with   color and spectral Doppler, with and without compression.    FINDINGS:    RIGHT:  Normal compressibility of the RIGHT common femoral, femoral and popliteal   veins.  Dopplerexamination shows normal spontaneous and phasic flow.  No RIGHT calf vein thrombosis is detected.    LEFT:  Normal compressibility of the LEFT common femoral, femoral and popliteal   veins.  Doppler examination shows normal spontaneous and phasic flow.  No LEFT calf vein thrombosis is detected.    IMPRESSION:  No evidence of deep venous thrombosis in either lower extremity.    --- End of Report ---     GABRIEL PEDRO MD; Attending Radiologist  This document has been electronically signed. Dec 22 2023 12:42PM    ORT Score -   Family Hx of substance abuse	Female	      Male  Alcohol 	                                           1                     3  Illegal drugs	                                   2                     3  Rx drugs                                           4 	                  4  Personal Hx of substance abuse		  Alcohol 	                                          3	                  3  Illegal drugs                                     4	                  4  Rx drugs                                            5 	                  5  Age between 16- 45 years	           1                     1  hx preadolescent sexual abuse	   3 	                  0  Psychological disease		  ADD, OCD, bipolar, schizophrenia   2	          2  Depression                                           1 	          1  Total: 0    a score of 3 or lower indicates low risk for opioid abuse		  a score of 4-7 indicates moderate risk for opioid abuse		  a score of 8 or higher indicates high risk for opioid abuse    REVIEW OF SYSTEMS:  CONSTITUTIONAL: No fever or fatigue  HEENT:  No difficulty hearing, no change in vision  NECK: No pain or stiffness  RESPIRATORY: No cough, wheezing, chills or hemoptysis; No shortness of breath  CARDIOVASCULAR: No chest pain, palpitations, dizziness, or leg swelling  GASTROINTESTINAL: No loss of appetite, decreased PO intake. No abdominal or epigastric pain. No nausea, vomiting; No diarrhea or constipation.   GENITOURINARY: No dysuria, frequency, hematuria, retention or incontinence  MUSCULOSKELETAL: + Right knee pain and swelling; No back pain, no upper or left lower motor strength weakness, + RLE motor weakness due to pain, no saddle anesthesia, bowel/bladder incontinence, no falls   NEURO: No headaches, No numbness/tingling b/l LE  ENDOCRINE: No polyuria, polydipsia, heat or cold intolerance; No hair loss  PSYCHIATRIC: No depression, anxiety or difficulty sleeping    PHYSICAL EXAM:  GENERAL:  Alert & Oriented X4, cooperative, NAD, Good concentration. Speech is clear.   RESPIRATORY: Respirations even and unlabored. Clear to auscultation bilaterally  CARDIOVASCULAR: Normal S1/S2, regular rate and rhythm  GASTROINTESTINAL:  Soft, Nontender, Nondistended; Bowel sounds present  PERIPHERAL VASCULAR:  Extremities warm, + RLE edema secondary to surgery. 2+ Peripheral Pulses, No cyanosis, No calf tenderness  MUSCULOSKELETAL: Motor Strength 5/5 B/L upper and left lower extremities; RLE motor strength 3/5 due to pain, decreased ROM to RLE due to pain;  + tenderness on palpation of R knee   SKIN: Warm, dry, Right knee dressing in place c/d/i     Risk factors associated with adverse outcomes related to opioid treatment  [ ]  Concurrent benzodiazepine use  [ ]  History/ Active substance use or alcohol use disorder  [ ] Psychiatric co-morbidity  [ ] Sleep apnea  [ ] COPD  [ ] BMI> 35  [ ] Liver dysfunction  [ ] Renal dysfunction  [ ] CHF  [x] Former smoker  [x]  Age > 60 years    [x]  [place]  Reviewed and Copied to Chart. See below.    Plan of care and goal oriented pain management treatment options were discussed with patient and /or primary care giver; all questions and concerns were addressed and care was aligned with patient's wishes.    Educated patient on goal oriented pain management treatment options     12-22-23 @ 13:45

## 2023-12-22 NOTE — PROGRESS NOTE ADULT - PROVIDER SPECIALTY LIST ADULT
Orthopedics
Internal Medicine
Pain Medicine
Internal Medicine
Orthopedics
Pain Medicine
Pain Medicine

## 2023-12-22 NOTE — PROGRESS NOTE ADULT - REASON FOR ADMISSION
S/p R TKA on 12/19/2023
S/p R TKA on 12/19/2023
Right Knee surgery

## 2023-12-22 NOTE — PROGRESS NOTE ADULT - ASSESSMENT
Confidential Drug Utilization Report  Search Terms: Adeel Sellers, 1955 Search Date: 12/19/2023 16:46:24 PM  The Drug Utilization Report below displays all of the controlled substance prescriptions, if any, that your patient has filled in the last twelve months. The information displayed on this report is compiled from pharmacy submissions to the Department, and accurately reflects the information as submitted by the pharmacies.    This report was requested by: Karen Rebolledo | Reference #: [id number]    You have not added a MARIANA number. Keeping your MARIANA number(s) up to date on the My MARIANA # tab will enable the separation of your prescriptions from others in the search results.    Practitioner Count: 2  Pharmacy Count: 2  Current Opioid Prescriptions: 1  Current Benzodiazepine Prescriptions: 0  Current Stimulant Prescriptions: 0      Patient Demographic Information (PDI)       PDI	First Name	Last Name	Birth Date	Gender	Street Address	[facility] Code  A	Adeel Sellers	1955	Male	[address] [facility]	[id number]  B	Adeel Sellers	1955	Male	[id number]	[place]	[address]  C	Adeel Sellers	1955	Male	[address] [facility] [facility]	[address]    Prescription Information      PDI Filter:    PDI	Current Rx	Drug Type	Rx Written	Rx Dispensed	Drug	Quantity	Days Supply	Prescriber Name	Prescriber MARIANA #	Payment Method	Dispenser  A	N	B	06/03/2023	06/03/2023	diazepam 2 mg tablet	12	4	Janis Granado MD	[id number]	Medicare	[id number]  B	Y	O	12/13/2023	12/15/2023	oxycodone-acetaminophen 7.5-325 mg tablet	28	7	Cornelio Cuevas MD	[id number]	[facility]  C	N	O	10/17/2023	10/18/2023	oxycodone-acetaminophen 5-325 mg tab	40	7	Karan Rangel DPM	[id number]	Insurance	[facility]    * - Details of Drug Type : O = Opioid, B = Benzodiazepine, S = Stimulant    * - Drugs marked with an asterisk are compound drugs. If the compound drug is made up of more than one controlled substance, then each controlled substance will be a separate row in the table.         Confidential Drug Utilization Report  Search Terms: Aedel Sellers, 1955 Search Date: 12/19/2023 16:46:24 PM  The Drug Utilization Report below displays all of the controlled substance prescriptions, if any, that your patient has filled in the last twelve months. The information displayed on this report is compiled from pharmacy submissions to the Department, and accurately reflects the information as submitted by the pharmacies.    This report was requested by: Karen Rebolledo | Reference #: [id number]    You have not added a MARIANA number. Keeping your MARIANA number(s) up to date on the My MARIANA # tab will enable the separation of your prescriptions from others in the search results.    Practitioner Count: 2  Pharmacy Count: 2  Current Opioid Prescriptions: 1  Current Benzodiazepine Prescriptions: 0  Current Stimulant Prescriptions: 0      Patient Demographic Information (PDI)       PDI	First Name	Last Name	Birth Date	Gender	Street Address	[facility] Code  A	Adeel Sellers	1955	Male	[address] [address] [facility]	[address]  B	Adeel Sellers	1955	Male	[address]	[place]	[address]  C	Adeel Sellers	1955	Male	[address] [facility]	[address]    Prescription Information      PDI Filter:    PDI	Current Rx	Drug Type	Rx Written	Rx Dispensed	Drug	Quantity	Days Supply	Prescriber Name	Prescriber MARIANA #	Payment Method	Dispenser  A	N	B	06/03/2023	06/03/2023	diazepam 2 mg tablet	12	4	Janis Granado MD	[id number]	Medicare	[id number]  B	Y	O	12/13/2023	12/15/2023	oxycodone-acetaminophen 7.5-325 mg tablet	28	7	Cornelio Cuevas MD	[id number]	[facility]  C	N	O	10/17/2023	10/18/2023	oxycodone-acetaminophen 5-325 mg tab	40	7	Karan Rangel DPM	[id number]	Insurance	[facility]    * - Details of Drug Type : O = Opioid, B = Benzodiazepine, S = Stimulant    * - Drugs marked with an asterisk are compound drugs. If the compound drug is made up of more than one controlled substance, then each controlled substance will be a separate row in the table.

## 2023-12-22 NOTE — DISCHARGE NOTE NURSING/CASE MANAGEMENT/SOCIAL WORK - NSDCPEFALRISK_GEN_ALL_CORE
For information on Fall & Injury Prevention, visit: [email or web address] OR  [email or web address] OR  https://www.cdc.gov/steadi/patient.html For information on Fall & Injury Prevention, visit: [email or web address] OR  [email or web address] OR  https://www.cdc.gov/steadi/patient.html

## 2023-12-22 NOTE — DISCHARGE NOTE NURSING/CASE MANAGEMENT/SOCIAL WORK - PATIENT PORTAL LINK FT
You can access the FollowMyHealth Patient Portal offered by [facility] by registering at the following website: [email or web address]. By joining [employer]’s FollowMyHealth portal, you will also be able to view your health information using other applications (apps) compatible with our system. You can access the FollowMyHealth Patient Portal offered by [facility] by registering at the following website: [email or web address]. By joining [employer]’s FollowMyHealth portal, you will also be able to view your health information using other applications (apps) compatible with our system.

## 2023-12-22 NOTE — PROGRESS NOTE ADULT - SUBJECTIVE AND OBJECTIVE BOX
PGY-3 Progress Note discussed with attending    Please contact on TEAMS TILL 5:00 PM  PLEASE CONTACT ON CALL TEAM:  - On Call Team (Please refer to Heriberto) FROM 5:00 PM - 8:30PM  - Nightfloat Team FROM 8:30 -7:30 AM    CHIEF COMPLAINT & BRIEF HOSPITAL COURSE:    INTERVAL HPI/OVERNIGHT EVENTS:       REVIEW OF SYSTEMS:  CONSTITUTIONAL: +fever. No weight loss, or fatigue  RESPIRATORY: No cough, wheezing, chills or hemoptysis; No shortness of breath  CARDIOVASCULAR: No chest pain, palpitations, dizziness, or leg swelling  GASTROINTESTINAL: No abdominal pain. No nausea, vomiting, or hematemesis; No diarrhea or constipation. No melena or hematochezia.  GENITOURINARY: No dysuria or hematuria, urinary frequency  NEUROLOGICAL: No headaches, memory loss, loss of strength, numbness, or tremors  MSK: right knee pain  SKIN: Erythematous R. knee    Vital Signs Last 24 Hrs  T(C): 36.8 (22 Dec 2023 06:01), Max: 38.3 (21 Dec 2023 18:06)  T(F): 98.3 (22 Dec 2023 06:01), Max: 100.9 (21 Dec 2023 18:06)  HR: 71 (22 Dec 2023 06:01) (71 - 84)  BP: 146/77 (22 Dec 2023 06:01) (128/68 - 151/76)  BP(mean): 86 (21 Dec 2023 20:28) (86 - 86)  RR: 18 (22 Dec 2023 06:01) (18 - 20)  SpO2: 98% (22 Dec 2023 06:01) (96% - 98%)    Parameters below as of 22 Dec 2023 06:01  Patient On (Oxygen Delivery Method): room air        PHYSICAL EXAMINATION:  GENERAL: NAD, well built  HEAD:  Atraumatic, Normocephalic  EYES:  conjunctiva and sclera clear  NECK: Supple, No JVD, Normal thyroid  CHEST/LUNG: Clear to auscultation. Clear to percussion bilaterally; No rales, rhonchi, wheezing, or rubs  HEART: Regular rate and rhythm; No murmurs, rubs, or gallops  ABDOMEN: Soft, Nontender, Nondistended; Bowel sounds present  NERVOUS SYSTEM:  Alert & Oriented X3,    EXTREMITIES: R. knee ttp.   SKIN: R. knee warm and erythematous                          8.1    7.41  )-----------( 271      ( 22 Dec 2023 06:32 )             26.8     12-22    139  |  104  |  9   ----------------------------<  97  3.7   |  30  |  0.85    Ca    8.3<L>      22 Dec 2023 06:32  Phos  2.9     12-20  Mg     1.9     12-20    TPro  7.3  /  Alb  2.4<L>  /  TBili  0.5  /  DBili  x   /  AST  12  /  ALT  15  /  AlkPhos  92  12-20    LIVER FUNCTIONS - ( 20 Dec 2023 12:32 )  Alb: 2.4 g/dL / Pro: 7.3 g/dL / ALK PHOS: 92 U/L / ALT: 15 U/L DA / AST: 12 U/L / GGT: x           CARDIAC MARKERS ( 20 Dec 2023 12:32 )  x     / x     / 180 U/L / x     / <1.0 ng/mL          CAPILLARY BLOOD GLUCOSE      RADIOLOGY & ADDITIONAL TESTS:

## 2023-12-22 NOTE — PROGRESS NOTE ADULT - SUBJECTIVE AND OBJECTIVE BOX
68yMale    Diagnosis:  S/p R Total Knee Replacement POD# 3    Patient was seen and evaluated at bedside..   Patient with post-op fever last night 100.9.  Pt denies Chest pain, cough, chills, dysuria, SOB, dyspnea, paresthesias, N/V/D, abdominal pain, syncope, or pain anywhere else.   Pain is well controlled to the RLE today with some tightness and swelling.     ICU Vital Signs Last 24 Hrs  T(C): 36.8 (22 Dec 2023 06:01), Max: 38.3 (21 Dec 2023 18:06)  T(F): 98.3 (22 Dec 2023 06:01), Max: 100.9 (21 Dec 2023 18:06)  HR: 71 (22 Dec 2023 06:01) (71 - 84)  BP: 146/77 (22 Dec 2023 06:01) (128/68 - 151/76)  BP(mean): 86 (21 Dec 2023 20:28) (86 - 86)  ABP: --  ABP(mean): --  RR: 18 (22 Dec 2023 06:01) (18 - 20)  SpO2: 98% (22 Dec 2023 06:01) (96% - 98%)    O2 Parameters below as of 22 Dec 2023 06:01  Patient On (Oxygen Delivery Method): room air      Physical Exam:    General: AAOx3, NAD, resting comfortably in bed.    R KNEE:  Dressing is C/D/I.  Skin is pink and warm. Staples intact. No erythema. SILT.  Wound with no drainage, healing well.   Lower extremity:  No calf tenderness, calves are soft. 2+pulses. NVI. (+)EHL/FHL/ADF/APF.  Good capillary refill.                                    9.1    7.95  )-----------( 271      ( 21 Dec 2023 06:48 )             30.7   12-21    138  |  106  |  9   ----------------------------<  105<H>  3.9   |  28  |  0.87    Ca    8.0<L>      21 Dec 2023 06:48  Phos  2.9     12-20  Mg     1.9     12-20    TPro  7.3  /  Alb  2.4<L>  /  TBili  0.5  /  DBili  x   /  AST  12  /  ALT  15  /  AlkPhos  92  12-20    Urinalysis Basic - ( 21 Dec 2023 10:00 )    Color: Yellow / Appearance: Cloudy / S.019 / pH: x  Gluc: x / Ketone: Trace mg/dL  / Bili: Negative / Urobili: 0.2 mg/dL   Blood: x / Protein: Negative mg/dL / Nitrite: Negative   Leuk Esterase: Negative / RBC: 2 /HPF / WBC 0 /HPF   Sq Epi: x / Non Sq Epi: x / Bacteria: Few /HPF    < from: Xray Chest 1 View- PORTABLE-Urgent (Xray Chest 1 View- PORTABLE-Urgent .) (23 @ 10:04) >    INTERPRETATION:  AP erect chest on 2023 at 9:03 AM. Patient   has fever.    COMPARISON: None available.    Heart normal for projection.    Lungs are clear.    Old left mid clavicle fracture noted.    IMPRESSION: No acute finding. Old left clavicle fracture.    < end of copied text >        Impression:  69 y/o M S/p R Total Knee Replacement POD# 3  Plan:   -  Pain control  -  DVT prophylaxis with lovenox and venodynes   -  Daily Physical Therapy:  WBAT to the RLE  -  Discharge planning: Home tomorrow if afebrile x 24 hours  -  CXR and UA negative  -  B/l LE venous doppler ordered r/o DVT   -  Tylneol prn Fever  -  Heel bump to RLE  -  Incentive Spirometer  -  Case d/w Dr. Cuevas

## 2023-12-22 NOTE — PROGRESS NOTE ADULT - PROBLEM SELECTOR PLAN 1
Pt with acute pain right knee pain which is somatic in nature due to primary osteoarthritis status post Right Total Knee Replacement with Dr. Cuevas on 12/19/23. POD#2. Pt. had an RRT 12/19 for syncopal episode while with PT, patient was administered 1L fluid bolus with return to baseline status, cardiac monitor on.   Opioid pain recommendations   - Continue Tramadol 50mg PO q 8 hours. Monitor for sedation/ respiratory depression.   - Continue Oxycodone 5 mg PO q 4 hours PRN moderate pain. Monitor for sedation/ respiratory depression.   Non-opioid pain recommendations   - Continue Toradol 15 mg IVP q 6 hours PRN severe pain - monitor H/H  - Acetaminophen 1 gram PO q 8 hours for 24 hours only. Then q8h PRN. Monitor LFTs  Bowel Regimen  - Miralax 17G PO daily  - Senna 2 tablets at bedtime for constipation  Mild pain 1 - 3  - Non-pharmacological pain treatment recommendations  - Warm/ Cool packs PRN   - Repositioning extremity, elevation, imagery, relaxation, distraction.  - Physical therapy OOB if no contraindications   Recommendations discussed with primary team and RN.  Upon discharge – dc on Percocet 5/325mg po q 6 hours prn for 1 week. Pt to take OTC stool softeners.
Pt with acute pain right knee pain which is somatic in nature due to primary osteoarthritis status post Right Total Knee Replacement with Dr. Cuevas on 12/19/23. POD#3. Pt. had an RRT 12/19 for syncopal episode while with PT, patient was administered 1L fluid bolus with return to baseline status, cardiac monitor on. Pt stating feeling much better, no dizziness or lightheadedness reported.   Opioid pain recommendations   - Continue Tramadol 50mg PO q 8 hours. Monitor for sedation/ respiratory depression.   - Continue Oxycodone 5 mg PO q 4 hours PRN moderate pain. Monitor for sedation/ respiratory depression.   Non-opioid pain recommendations   - Continue Toradol 15 mg IVP q 6 hours PRN severe pain - monitor H/H  - Continue Acetaminophen 1 gram PO q 8 hours for 3 days. Monitor LFTs  Bowel Regimen  - Miralax 17G PO daily  - Senna 2 tablets at bedtime for constipation  Mild pain 1 - 3  - Non-pharmacological pain treatment recommendations  - Warm/ Cool packs PRN   - Repositioning extremity, elevation, imagery, relaxation, distraction.  - Physical therapy OOB if no contraindications   Recommendations discussed with primary team and RN.  Upon discharge – dc on Percocet 5/325mg po q 6 hours prn for 1 week. Pt to take OTC stool softeners.
Pt with acute pain right knee pain post-operative which is somatic and neuropathic in nature s/p elective right knee total knee replacement 12/19/23. POD#1.   Opioid pain recommendations   - Continue Tramadol 50mg PO q 8 hours. Monitor for sedation/ respiratory depression.   - Continue Oxycodone 5 mg PO q 4 hours PRN moderate pain. Monitor for sedation/ respiratory depression.   Non-opioid pain recommendations   - Continue Toradol 15 mg IVP q 6 hours PRN severe pain - monitor H/H  - Acetaminophen 1 gram PO q 8 hours for 24 hours only. Then q8h PRN. Monitor LFTs  Bowel Regimen  - Miralax 17G PO daily  - Senna 2 tablets at bedtime for constipation  Mild pain 1 - 3  - Non-pharmacological pain treatment recommendations  - Warm/ Cool packs PRN   - Repositioning extremity, elevation, imagery, relaxation, distraction.  - Physical therapy OOB if no contraindications   Recommendations discussed with primary team and RN.  Upon discharge – dc on Percocet 5/325mg po q 6 hours prn for 1 week. Pt to take OTC stool softeners.

## 2024-01-09 PROBLEM — S83.209A UNSPECIFIED TEAR OF UNSPECIFIED MENISCUS, CURRENT INJURY, UNSPECIFIED KNEE, INITIAL ENCOUNTER: Chronic | Status: ACTIVE | Noted: 2023-12-11

## 2024-01-09 PROBLEM — M19.079 PRIMARY OSTEOARTHRITIS, UNSPECIFIED ANKLE AND FOOT: Chronic | Status: ACTIVE | Noted: 2023-12-11

## 2024-01-09 PROBLEM — Z87.891 PERSONAL HISTORY OF NICOTINE DEPENDENCE: Chronic | Status: ACTIVE | Noted: 2023-12-11

## 2024-01-09 PROBLEM — M19.90 UNSPECIFIED OSTEOARTHRITIS, UNSPECIFIED SITE: Chronic | Status: ACTIVE | Noted: 2023-12-11

## 2024-01-10 PROBLEM — M54.12 CERVICAL RADICULOPATHY: Status: ACTIVE | Noted: 2023-06-22

## 2024-01-11 ENCOUNTER — APPOINTMENT (OUTPATIENT)
Dept: PAIN MANAGEMENT | Facility: CLINIC | Age: 69
End: 2024-01-11
Payer: MEDICARE

## 2024-01-11 VITALS — BODY MASS INDEX: 24.33 KG/M2 | HEIGHT: 67 IN | WEIGHT: 155 LBS

## 2024-01-11 DIAGNOSIS — M54.12 RADICULOPATHY, CERVICAL REGION: ICD-10-CM

## 2024-01-11 PROCEDURE — 99213 OFFICE O/P EST LOW 20 MIN: CPT

## 2024-01-11 NOTE — HISTORY OF PRESENT ILLNESS
[Left Arm] : left arm [8] : 8 [Localized] : localized [Constant] : constant [Household chores] : household chores [Leisure] : leisure [Social interactions] : social interactions [Nothing helps with pain getting better] : Nothing helps with pain getting better [] : no [FreeTextEntry1] : LT shoulder  [FreeTextEntry6] : stiffness  [de-identified] : movement

## 2024-01-11 NOTE — PHYSICAL EXAM
[de-identified] : Gen: NAD, patient using a cane to assist with ambulation Head: NC/AT Neck: limited rotation bilaterally, limited flexion and extension Eyes: no glasses, no scleral icterus ENT: mucous membranes moist CV: RRR, S1 S2, no mrg Lungs: CTAB, nonlabored breathing Abd: soft, NT/ND Ext: LUE: limited flexion/abduction to less than 80 degrees, limited internal rotation; RLE: incision over the anterior knee c/d/i Neuro: CN intact UEs +5 L +5 R shoulder abduction +5 L +5 R arm abduction +5 L +5 R forearm flexion +5 L +5 R forearm extension +5 L +5 R finger flexion +5 L +5 R  strength Psych: normal affect Skin: no visible lesions

## 2024-01-11 NOTE — DISCUSSION/SUMMARY
[de-identified] : QASIM KAUR is a 68 year-old man presenting for a RPV for a history of chronic neck and left shoulder pain.  Prior treatment: Physical therapy x3 months Acetaminophen  [employer] Chiropractor  Plan: 1) MRI cervical spine images reviewed with the patient. 2) Will defer ANTOINETTE at this time, as he is not having neck pain or cervical radicular symptoms at the moment. 3) Referral back to ortho sports/shoulder for treatment of left shoulder pain/rotator cuff injury/labral tear 4) Continue physical therapy 5) RTC 2 months or prn

## 2024-01-31 ENCOUNTER — APPOINTMENT (OUTPATIENT)
Dept: ORTHOPEDIC SURGERY | Facility: CLINIC | Age: 69
End: 2024-01-31
Payer: MEDICARE

## 2024-01-31 PROCEDURE — J3490M: CUSTOM

## 2024-01-31 PROCEDURE — 20611 DRAIN/INJ JOINT/BURSA W/US: CPT | Mod: LT

## 2024-01-31 PROCEDURE — 99213 OFFICE O/P EST LOW 20 MIN: CPT | Mod: 25

## 2024-02-13 NOTE — PATIENT PROFILE ADULT - NSPROGENBLOODRESTRICT_GEN_A_NUR
Patient: Andrew Atkinson    Procedure: Procedure(s):  ARTHROSCOPY, SHOULDER, WITH ROTATOR CUFF REPAIR, SUBACROMIAL SPACE DECOMPRESSION, AND biceps tenodesis       Anesthesia Type:  General    Note:  Disposition: Outpatient   Postop Pain Control: Uneventful            Sign Out: Well controlled pain   PONV: No   Neuro/Psych: Uneventful            Sign Out: Acceptable/Baseline neuro status   Airway/Respiratory: Uneventful            Sign Out: Acceptable/Baseline resp. status   CV/Hemodynamics: Uneventful            Sign Out: Acceptable CV status; No obvious hypovolemia; No obvious fluid overload   Other NRE: NONE   DID A NON-ROUTINE EVENT OCCUR? No           Last vitals:  Vitals Value Taken Time   /64 02/13/24 1300   Temp 36.3  C (97.3  F) 02/13/24 1225   Pulse 49 02/13/24 1309   Resp 28 02/13/24 1309   SpO2 99 % 02/13/24 1309   Vitals shown include unfiled device data.    Electronically Signed By: Merna Brunson MD  February 13, 2024  1:10 PM   none

## 2024-02-14 NOTE — HISTORY OF PRESENT ILLNESS
[] : yes [FreeTextEntry5] : 01/31/24: QASIM is a 69 y/o M here for his LEFT shoulder pain x 6 months w/o injury. saw Too +xays +MRI +CSI 10/09/23 w/o relief. denies prior injury.  OF NOTE; h/o R TKA 12/19/23 [de-identified] : 01/31/24: QASIM is a 67 y/o M here for his LEFT shoulder pain x 6 months w/o injury. saw Too +xays +MRI +CSI 10/09/23 w/o relief. denies prior injury.  OF NOTE; h/o R TKA 12/19/23 [FreeTextEntry1] : LEFT shoulder [de-identified] : 11/13/23 [de-identified] : [facility]  [de-identified] : xrays and MRI

## 2024-02-14 NOTE — IMAGING
[de-identified] : The patient is a well appearing 68 year year old male of their stated age. Neck is supple & nontender to palpation. Negative Spurling's test.   General: in no acute distress, seated comfortably, moving easily Skin: No discoloration, rashes; on palpation skin is dry, Neuro: Normal sensation all dermatomes, motor all myotomes Vascular: Normal pulses, no edema, normal temperature Coordination and balance: Normal Psych: normal mood and affect, non pressured speech, alert and oriented x3   LEFT Shoulder Inspection: Scapula Winging: Negative Deformity: None Erythema: None Ecchymosis: None Abrasions: None Effusion: None   Range of Motion: Active Forward Flexion: 120 degrees Passive Forward Flexion: 130 degrees Active IR : sacrum Passive ER : 15 degrees   PASSIVE MOTION EQUALS ACTIVE MOTION   Motor Exam: Forward Flexion: 4+ out of 5 Flexion Plane of Scapula: 5 out of 5 Abduction: 4+ out of 5 Internal Rotation: 5 out of 5 External Rotation: 4+ out of 5 Distal Motor Strength: 5 out of 5   Stability Testing: Anterior: 1+ Posterior: 1+ Sulcus N: 1+ Sulcus ER: 1+   Provocative Tests: Drop Arm: Negative Rae/Impingement: Positive [place]: Positive X-Arm Adduction: Negative Belly Press: Negative Bear Hug: Negative Lift Off: Negative Apprehension: Negative Relocation: Negative Posterior Load & Shift: Negative   Palpation: AC Joint: TTP Clavicle: Nontender SC Joint: Nontender Bicipital Groove: TTP Coracoid Process: Nontender Pectoralis Minor Tendon: Nontender Pectoralis Major Tendon: Nontender & palpably intact Latissimus Dorsi: Nontender Proximal Humerus: Nontender Scapula Body: Nontender Medial Scapula Border: Nontender Scapula Spine: Nontender   Neurologic Exam: Sensation to Light Touch: Axillary: Grossly intact Ulnar: Grossly intact Radial: Grossly intact Median: Grossly intact   Circulatory/Pulses: Ulnar: 2+ Radial: 2+ CR < 2s   X-RAYS of the LEFT shoulder (AP, SCAPULAR Y, AND AXILLARY VIEWS): no fracture or dislocation; Bigliani Type 2 acromion; mild degenerative change at ACJ; subacromial spurring

## 2024-03-20 ENCOUNTER — APPOINTMENT (OUTPATIENT)
Dept: ORTHOPEDIC SURGERY | Facility: CLINIC | Age: 69
End: 2024-03-20
Payer: MEDICARE

## 2024-03-20 DIAGNOSIS — G89.29 PAIN IN LEFT SHOULDER: ICD-10-CM

## 2024-03-20 DIAGNOSIS — M25.512 PAIN IN LEFT SHOULDER: ICD-10-CM

## 2024-03-20 DIAGNOSIS — M19.012 PRIMARY OSTEOARTHRITIS, LEFT SHOULDER: ICD-10-CM

## 2024-03-20 DIAGNOSIS — M75.02 ADHESIVE CAPSULITIS OF LEFT SHOULDER: ICD-10-CM

## 2024-03-20 PROCEDURE — 99214 OFFICE O/P EST MOD 30 MIN: CPT | Mod: 57

## 2024-03-26 PROBLEM — M25.512 CHRONIC LEFT SHOULDER PAIN: Status: ACTIVE | Noted: 2023-11-27

## 2024-03-26 PROBLEM — M19.012 ARTHROSIS OF LEFT ACROMIOCLAVICULAR JOINT: Status: ACTIVE | Noted: 2023-10-09

## 2024-03-26 NOTE — DISCUSSION/SUMMARY
[de-identified] : Plan: Patient understands that He is a candidate for LEFT SHOULDER RCR, BT, DCE, SAD, CYST DECOMPRESSION. Discussed procedure. Discussed non-operative and operative treatment options. All questions and concerns regarding the surgery were addressed. Went over the recovery timeline and expected outcomes following surgery. Patient elected to move forward with the surgical procedure.  Follow-Up: 2 weeks post-op   We discussed their diagnosis and treatment options at length including the risks and benefits of both surgical and nonsurgical options.  - Given the fact that they continue to have pain, weakness, and have failed conservative treatment including icing, antiinflammatory medications, injection, and PT, they are a surgical candidate.  - The risks, benefits, and alternatives to LT shoulder surgical arthroscopy with rotator cuff repair, SAD, GHD synovectomy  The patient's current medication management of their orthopedic diagnosis was reviewed today: (1) We discussed a comprehensive treatment plan that included possible pharmaceutical management involving the use of prescription strength medications including but not limited to options such as oral Naprosyn 500mg BID, once daily Meloxicam 15 mg, or 500-650 mg Tylenol versus over the counter oral medications and topical prescription NSAID Pennsaid vs over the counter Voltaren gel.  (2) There is a moderate risk of morbidity with further treatment, especially from use of prescription strength medications and possible side effects of these medications which include upset stomach with oral medications, skin reactions to topical medications and cardiac/renal issues with long term use.  (3) I recommended that the patient follow-up with their medical physician to discuss any significant specific potential issues with long term medication use such as interactions with current medications or with exacerbation of underlying medical comorbidities.  (4) The benefits and risks associated with use of injectable, oral or topical, prescription and over the counter anti-inflammatory medications were discussed with the patient. The patient voiced understanding of the risks including but not limited to bleeding, stroke, kidney dysfunction, heart disease, and were referred to the black box warning label for further information.

## 2024-03-26 NOTE — IMAGING
[de-identified] : The patient is a well appearing 68 year year old male of their stated age. Neck is supple & nontender to palpation. Negative Spurling's test.   General: in no acute distress, seated comfortably, moving easily Skin: No discoloration, rashes; on palpation skin is dry, Neuro: Normal sensation all dermatomes, motor all myotomes Vascular: Normal pulses, no edema, normal temperature Coordination and balance: Normal Psych: normal mood and affect, non pressured speech, alert and oriented x3   LEFT Shoulder Inspection: Scapula Winging: Negative Deformity: None Erythema: None Ecchymosis: None Abrasions: None Effusion: None   Range of Motion: Active Forward Flexion: 120 degrees Passive Forward Flexion: 130 degrees Active IR : sacrum Passive ER : 15 degrees   PASSIVE MOTION EQUALS ACTIVE MOTION   Motor Exam: Forward Flexion: 4+ out of 5 Flexion Plane of Scapula: 5 out of 5 Abduction: 4+ out of 5 Internal Rotation: 5 out of 5 External Rotation: 4+ out of 5 Distal Motor Strength: 5 out of 5   Stability Testing: Anterior: 1+ Posterior: 1+ Sulcus N: 1+ Sulcus ER: 1+   Provocative Tests: Drop Arm: Negative Rae/Impingement: Positive [place]: Positive X-Arm Adduction: Negative Belly Press: Negative Bear Hug: Negative Lift Off: Negative Apprehension: Negative Relocation: Negative Posterior Load & Shift: Negative   Palpation: AC Joint: TTP Clavicle: Nontender SC Joint: Nontender Bicipital Groove: TTP Coracoid Process: Nontender Pectoralis Minor Tendon: Nontender Pectoralis Major Tendon: Nontender & palpably intact Latissimus Dorsi: Nontender Proximal Humerus: Nontender Scapula Body: Nontender Medial Scapula Border: Nontender Scapula Spine: Nontender   Neurologic Exam: Sensation to Light Touch: Axillary: Grossly intact Ulnar: Grossly intact Radial: Grossly intact Median: Grossly intact   Circulatory/Pulses: Ulnar: 2+ Radial: 2+ CR < 2s   X-RAYS of the LEFT shoulder (AP, SCAPULAR Y, AND AXILLARY VIEWS): no fracture or dislocation; Bigliani Type 2 acromion; mild degenerative change at ACJ; subacromial spurring

## 2024-03-26 NOTE — HISTORY OF PRESENT ILLNESS
[] : yes [de-identified] : 3/20/24: QASIM is here to f/up on his LEFT shoulder. He reports no change since the last visit.   01/31/24: QASIM is a 67 y/o M here for his LEFT shoulder pain x 6 months w/o injury. saw Too +xays +MRI +CSI 10/09/23 w/o relief. denies prior injury.   OF NOTE; h/o R TKA 12/19/23 [FreeTextEntry1] : LEFT shoulder [de-identified] : 11/13/23 [de-identified] : [facility]  [de-identified] : xrays and MRI

## 2024-04-11 ENCOUNTER — APPOINTMENT (OUTPATIENT)
Dept: ORTHOPEDIC SURGERY | Facility: AMBULATORY SURGERY CENTER | Age: 69
End: 2024-04-11
Payer: MEDICARE

## 2024-04-11 PROCEDURE — 29823 SHO ARTHRS SRG XTNSV DBRDMT: CPT | Mod: LT

## 2024-04-11 PROCEDURE — 23140 REMOVAL OF BONE LESION: CPT | Mod: 59,LT

## 2024-04-11 PROCEDURE — 29825 SHO ARTHRS SRG LSS&RESCJ ADS: CPT | Mod: 59,LT

## 2024-04-11 PROCEDURE — 29820 SHO ARTHRS SRG PRTL SYNVCT: CPT | Mod: 59,LT

## 2024-04-11 PROCEDURE — 29826 SHO ARTHRS SRG DECOMPRESSION: CPT | Mod: LT

## 2024-04-11 RX ORDER — IBUPROFEN 600 MG/1
600 TABLET, FILM COATED ORAL 3 TIMES DAILY
Qty: 90 | Refills: 0 | Status: ACTIVE | COMMUNITY
Start: 2024-04-11 | End: 1900-01-01

## 2024-04-11 RX ORDER — OXYCODONE 5 MG/1
5 TABLET ORAL
Qty: 20 | Refills: 0 | Status: ACTIVE | COMMUNITY
Start: 2024-04-11 | End: 1900-01-01

## 2024-04-11 RX ORDER — ONDANSETRON 4 MG/1
4 TABLET ORAL EVERY 8 HOURS
Qty: 20 | Refills: 0 | Status: ACTIVE | COMMUNITY
Start: 2024-04-11 | End: 1900-01-01

## 2024-04-11 RX ORDER — ACETAMINOPHEN 500 MG/1
500 TABLET ORAL 3 TIMES DAILY
Qty: 60 | Refills: 0 | Status: ACTIVE | COMMUNITY
Start: 2024-04-11 | End: 1900-01-01

## 2024-04-29 ENCOUNTER — APPOINTMENT (OUTPATIENT)
Dept: ORTHOPEDIC SURGERY | Facility: CLINIC | Age: 69
End: 2024-04-29
Payer: MEDICARE

## 2024-04-29 DIAGNOSIS — Z98.890 OTHER SPECIFIED POSTPROCEDURAL STATES: ICD-10-CM

## 2024-04-29 PROCEDURE — 99024 POSTOP FOLLOW-UP VISIT: CPT

## 2024-04-29 NOTE — HISTORY OF PRESENT ILLNESS
[0] : 0 [5] : 5 [Localized] : localized [Constant] : constant [Household chores] : household chores [Sleep] : sleep [Social interactions] : social interactions [Rest] : rest [Meds] : meds [Lying in bed] : lying in bed [Retired] : Work status: retired [de-identified] : 4/29/24: Here for f/u left shoulder POV #1 s/p left shoulder arthroscopy with capsular release, synovectomy, decompression and excision or paralabral cyst.  [] : no [FreeTextEntry1] : left shoulder  [FreeTextEntry9] : Tylenol and advil  [de-identified] : at night.  [de-identified] : 4/11/24

## 2024-04-29 NOTE — PHYSICAL EXAM
[de-identified] : No erythema, no discharge, no increased warmth to touch. Sutures intact. ROM consistent with immobility due to brace, strength testing deferred due to post-op status. Distally neurovascularly intact with median, radial, ulnar, MSC, axillary nerves intact. 2+ radial pulse with capillary refill >2 seconds

## 2024-04-29 NOTE — DISCUSSION/SUMMARY
[de-identified] : Assessment & Plan: The patient is approximately 2 weeks postoperative. Sutures removed and Steri Strips applied today. The patient is instructed in wound management. The patient's post-op plan, protocol and activity modifications have been thoroughly discussed and the patient expressed understanding. The patient will control pain as discussed & continue ice and elevation as needed. The patient otherwise may advance activity as discussed.   Arthroscopy photos were reviewed in great detail.   Prescription Medications Ordered: [None]   Physical Therapy: [Start per protocol, new prescription given today, continue home exercise program]   Braces/DME Ordered: [Continue Postop Brace]   Activity/Work/Sports Status: [Out of work/gym/sports]   Follow-Up: [4 weeks]

## 2024-06-24 ENCOUNTER — APPOINTMENT (OUTPATIENT)
Dept: ORTHOPEDIC SURGERY | Facility: CLINIC | Age: 69
End: 2024-06-24

## 2024-09-23 ENCOUNTER — APPOINTMENT (OUTPATIENT)
Dept: ORTHOPEDIC SURGERY | Facility: CLINIC | Age: 69
End: 2024-09-23
Payer: MEDICARE

## 2024-09-23 DIAGNOSIS — Z00.00 ENCOUNTER FOR GENERAL ADULT MEDICAL EXAMINATION W/OUT ABNORMAL FINDINGS: ICD-10-CM

## 2024-09-23 DIAGNOSIS — M70.60 TROCHANTERIC BURSITIS, UNSPECIFIED HIP: ICD-10-CM

## 2024-09-23 PROCEDURE — 73503 X-RAY EXAM HIP UNI 4/> VIEWS: CPT | Mod: LT

## 2024-09-23 PROCEDURE — 72100 X-RAY EXAM L-S SPINE 2/3 VWS: CPT

## 2024-09-23 PROCEDURE — 99213 OFFICE O/P EST LOW 20 MIN: CPT | Mod: 25

## 2024-09-23 NOTE — IMAGING
[de-identified] : Constitutional: well developed and well nourished, able to communicate Cardiovascular: Peripheral vascular exam is grossly normal Neurologic: Alert and oriented, no acute distress. Skin: normal skin with no ulcers, rashes, or lesions Pulmonary: No respiratory distress, breathing comfortably on room air Lymphatics: No obvious lymphadenopathy or lymphedema in areas examined  LOWER EXTREMITY/LEFT HIP EXAM Standing pelvic alignment: Symmetric with no Trendelenburg Atrophy: none Ecchymosis/swelling: none  Range of Motion Hip: Flexion/extension/ER/IR     / EX 20/ ER 45/ IR 20 / AB 60 /AD 20 Impingement with flexion adduction and internal rotation: pos Contracture: none Snapping hip: negative Greater trochanter: POS tenderness  Neurovascular Distal extremities: warm to touch Sensation to light touch: intact Muscle strength: 5/5  [id number] Xrays of the Left hip 3v were taken demonstrating tonnis grade 2 OA lumbar with diffuse DDD

## 2024-09-23 NOTE — HISTORY OF PRESENT ILLNESS
[de-identified] : 09/23/2024: Patient is here for evaluation of left hip pain for the past few months with no prior injury. Patient notes last year he had right knee and right foot surgery last year which may have been causing him to favor the left side. Patient has been diagnosed with polymyalgia rheumatica in May and is currently being treated for that. No formal treatment for hips, pain associated with taking the stairs and prolonged walking. Patient reports pain radiates down the LLE, denies n/t. Patient has been taking meloxicam and kevzara to manage polymyalgia rheumatica.   prior knee replacement right knee left hip with lateral side pain

## 2024-09-23 NOTE — ASSESSMENT
[FreeTextEntry1] : 69 year M Left hip bursitis and mild hip OA, and hx of right TKA (on WC) - physical therapy for GT bursitis - CSI left GT in the future if no relief. - continue with mobic 6 week follow up

## 2024-10-14 ENCOUNTER — APPOINTMENT (OUTPATIENT)
Dept: ORTHOPEDIC SURGERY | Facility: CLINIC | Age: 69
End: 2024-10-14
Payer: OTHER MISCELLANEOUS

## 2024-10-14 DIAGNOSIS — M17.12 UNILATERAL PRIMARY OSTEOARTHRITIS, LEFT KNEE: ICD-10-CM

## 2024-10-14 PROCEDURE — 73564 X-RAY EXAM KNEE 4 OR MORE: CPT | Mod: LT

## 2024-10-14 PROCEDURE — 99205 OFFICE O/P NEW HI 60 MIN: CPT | Mod: 25

## 2024-10-14 PROCEDURE — 20610 DRAIN/INJ JOINT/BURSA W/O US: CPT | Mod: LT

## 2024-10-21 ENCOUNTER — APPOINTMENT (OUTPATIENT)
Dept: ORTHOPEDIC SURGERY | Facility: CLINIC | Age: 69
End: 2024-10-21
Payer: MEDICARE

## 2024-10-21 DIAGNOSIS — M70.60 TROCHANTERIC BURSITIS, UNSPECIFIED HIP: ICD-10-CM

## 2024-10-21 PROCEDURE — 20610 DRAIN/INJ JOINT/BURSA W/O US: CPT | Mod: LT

## 2024-10-21 RX ORDER — SARILUMAB 200 MG/1.14ML
200 INJECTION, SOLUTION SUBCUTANEOUS
Refills: 0 | Status: ACTIVE | COMMUNITY

## 2024-10-28 ENCOUNTER — APPOINTMENT (OUTPATIENT)
Dept: ORTHOPEDIC SURGERY | Facility: CLINIC | Age: 69
End: 2024-10-28
Payer: OTHER MISCELLANEOUS

## 2024-10-28 DIAGNOSIS — M17.12 UNILATERAL PRIMARY OSTEOARTHRITIS, LEFT KNEE: ICD-10-CM

## 2024-10-28 PROCEDURE — 99215 OFFICE O/P EST HI 40 MIN: CPT

## 2024-12-09 ENCOUNTER — APPOINTMENT (OUTPATIENT)
Dept: ORTHOPEDIC SURGERY | Facility: CLINIC | Age: 69
End: 2024-12-09
Payer: OTHER MISCELLANEOUS

## 2024-12-09 DIAGNOSIS — M17.12 UNILATERAL PRIMARY OSTEOARTHRITIS, LEFT KNEE: ICD-10-CM

## 2024-12-09 PROCEDURE — 99214 OFFICE O/P EST MOD 30 MIN: CPT

## 2025-01-06 ENCOUNTER — APPOINTMENT (OUTPATIENT)
Dept: ORTHOPEDIC SURGERY | Facility: CLINIC | Age: 70
End: 2025-01-06
Payer: OTHER MISCELLANEOUS

## 2025-01-06 DIAGNOSIS — Z87.39 PERSONAL HISTORY OF OTHER DISEASES OF THE MUSCULOSKELETAL SYSTEM AND CONNECTIVE TISSUE: ICD-10-CM

## 2025-01-06 DIAGNOSIS — S83.242A OTHER TEAR OF MEDIAL MENISCUS, CURRENT INJURY, LEFT KNEE, INITIAL ENCOUNTER: ICD-10-CM

## 2025-01-06 PROCEDURE — 99204 OFFICE O/P NEW MOD 45 MIN: CPT | Mod: 25

## 2025-01-06 PROCEDURE — 20610 DRAIN/INJ JOINT/BURSA W/O US: CPT | Mod: LT

## 2025-01-14 ENCOUNTER — APPOINTMENT (OUTPATIENT)
Dept: ORTHOPEDIC SURGERY | Facility: CLINIC | Age: 70
End: 2025-01-14

## 2025-02-03 ENCOUNTER — APPOINTMENT (OUTPATIENT)
Dept: ORTHOPEDIC SURGERY | Facility: CLINIC | Age: 70
End: 2025-02-03

## 2025-02-03 VITALS — WEIGHT: 157 LBS | HEIGHT: 67 IN | BODY MASS INDEX: 24.64 KG/M2

## 2025-02-03 DIAGNOSIS — M19.041 PRIMARY OSTEOARTHRITIS, RIGHT HAND: ICD-10-CM

## 2025-02-03 DIAGNOSIS — M65.322 TRIGGER FINGER, LEFT INDEX FINGER: ICD-10-CM

## 2025-02-03 DIAGNOSIS — M19.042 PRIMARY OSTEOARTHRITIS, LEFT HAND: ICD-10-CM

## 2025-02-03 DIAGNOSIS — M65.331 TRIGGER FINGER, RIGHT MIDDLE FINGER: ICD-10-CM

## 2025-02-03 PROCEDURE — 73130 X-RAY EXAM OF HAND: CPT | Mod: 50

## 2025-02-03 PROCEDURE — 20600 DRAIN/INJ JOINT/BURSA W/O US: CPT | Mod: 50

## 2025-02-03 PROCEDURE — 99213 OFFICE O/P EST LOW 20 MIN: CPT | Mod: 25

## 2025-02-10 PROBLEM — M65.322 TRIGGER INDEX FINGER OF LEFT HAND: Status: ACTIVE | Noted: 2025-02-10

## 2025-02-10 PROBLEM — M19.041 OSTEOARTHRITIS OF FINGER OF RIGHT HAND: Status: ACTIVE | Noted: 2025-02-10

## 2025-02-10 PROBLEM — M65.331 TRIGGER MIDDLE FINGER OF RIGHT HAND: Status: ACTIVE | Noted: 2025-02-10

## 2025-02-24 ENCOUNTER — APPOINTMENT (OUTPATIENT)
Dept: ORTHOPEDIC SURGERY | Facility: CLINIC | Age: 70
End: 2025-02-24

## 2025-02-24 DIAGNOSIS — M65.322 TRIGGER FINGER, LEFT INDEX FINGER: ICD-10-CM

## 2025-02-24 DIAGNOSIS — M77.8 OTHER ENTHESOPATHIES, NOT ELSEWHERE CLASSIFIED: ICD-10-CM

## 2025-02-24 DIAGNOSIS — M65.331 TRIGGER FINGER, RIGHT MIDDLE FINGER: ICD-10-CM

## 2025-02-24 DIAGNOSIS — M19.041 PRIMARY OSTEOARTHRITIS, RIGHT HAND: ICD-10-CM

## 2025-02-24 PROCEDURE — 20551 NJX 1 TENDON ORIGIN/INSJ: CPT | Mod: 59

## 2025-02-24 PROCEDURE — 20605 DRAIN/INJ JOINT/BURSA W/O US: CPT | Mod: RT

## 2025-02-26 ENCOUNTER — APPOINTMENT (OUTPATIENT)
Dept: ORTHOPEDIC SURGERY | Facility: CLINIC | Age: 70
End: 2025-02-26
Payer: MEDICARE

## 2025-02-26 DIAGNOSIS — M70.60 TROCHANTERIC BURSITIS, UNSPECIFIED HIP: ICD-10-CM

## 2025-02-26 PROCEDURE — J3490M: CUSTOM

## 2025-02-26 PROCEDURE — 20610 DRAIN/INJ JOINT/BURSA W/O US: CPT | Mod: 50

## 2025-02-26 PROCEDURE — 99203 OFFICE O/P NEW LOW 30 MIN: CPT | Mod: 25

## 2025-03-03 ENCOUNTER — APPOINTMENT (OUTPATIENT)
Dept: ORTHOPEDIC SURGERY | Facility: CLINIC | Age: 70
End: 2025-03-03
Payer: OTHER MISCELLANEOUS

## 2025-03-03 DIAGNOSIS — Z96.651 PRESENCE OF RIGHT ARTIFICIAL KNEE JOINT: ICD-10-CM

## 2025-03-03 PROBLEM — M19.041 OSTEOARTHRITIS OF METACARPOPHALANGEAL (MCP) JOINT OF RIGHT THUMB: Status: ACTIVE | Noted: 2025-03-03

## 2025-03-03 PROBLEM — M77.8 LEFT WRIST TENDINITIS: Status: ACTIVE | Noted: 2025-03-03

## 2025-03-03 PROCEDURE — 99215 OFFICE O/P EST HI 40 MIN: CPT

## 2025-03-03 PROCEDURE — 73564 X-RAY EXAM KNEE 4 OR MORE: CPT | Mod: RT

## 2025-03-10 ENCOUNTER — APPOINTMENT (OUTPATIENT)
Dept: ORTHOPEDIC SURGERY | Facility: CLINIC | Age: 70
End: 2025-03-10

## 2025-03-17 ENCOUNTER — APPOINTMENT (OUTPATIENT)
Dept: ORTHOPEDIC SURGERY | Facility: CLINIC | Age: 70
End: 2025-03-17
Payer: OTHER MISCELLANEOUS

## 2025-03-17 PROCEDURE — 99215 OFFICE O/P EST HI 40 MIN: CPT

## 2025-03-24 ENCOUNTER — APPOINTMENT (OUTPATIENT)
Dept: ORTHOPEDIC SURGERY | Facility: CLINIC | Age: 70
End: 2025-03-24
Payer: OTHER MISCELLANEOUS

## 2025-03-24 DIAGNOSIS — Z96.651 PRESENCE OF RIGHT ARTIFICIAL KNEE JOINT: ICD-10-CM

## 2025-03-24 PROCEDURE — 99215 OFFICE O/P EST HI 40 MIN: CPT

## 2025-03-26 ENCOUNTER — TRANSCRIPTION ENCOUNTER (OUTPATIENT)
Age: 70
End: 2025-03-26

## 2025-03-27 ENCOUNTER — APPOINTMENT (OUTPATIENT)
Dept: ORTHOPEDIC SURGERY | Facility: CLINIC | Age: 70
End: 2025-03-27

## 2025-05-06 ENCOUNTER — NON-APPOINTMENT (OUTPATIENT)
Age: 70
End: 2025-05-06

## 2025-08-25 ENCOUNTER — APPOINTMENT (OUTPATIENT)
Dept: ORTHOPEDIC SURGERY | Facility: CLINIC | Age: 70
End: 2025-08-25

## (undated) DEVICE — DRAPE TOWEL BLUE 17" X 24"

## (undated) DEVICE — STRYKER MIXEVAC 3 BONE CEMENT MIXER

## (undated) DEVICE — SOL INJ NS 0.9% 100ML

## (undated) DEVICE — FOR-TOURNIQUET 1130808443: Type: DURABLE MEDICAL EQUIPMENT

## (undated) DEVICE — SAW BLADE STRYKER SAGITTAL DUAL CUT 18MMX90MMX1.27MM

## (undated) DEVICE — SOL INJ NS 0.9% 500ML 1-PORT

## (undated) DEVICE — SUT POLYSORB 1 18" UNDYED

## (undated) DEVICE — TOURNIQUET CUFF 34" DUAL PORT W PLC

## (undated) DEVICE — GLV 8.5 PROTEXIS (BLUE)

## (undated) DEVICE — DRAPE LIGHT HANDLE COVER (BLUE)

## (undated) DEVICE — ELCTR AQUAMANTYS BIPOLAR SEALER 6.0

## (undated) DEVICE — SUT POLYSORB 2-0 30" GS-10 UNDYED

## (undated) DEVICE — HOOD FLYTE STRYKER HELMET SHIELD

## (undated) DEVICE — GLV 8 PROTEXIS (CREAM) MICRO

## (undated) DEVICE — POSITIONER STIRRUP STRAP W SLIP RING 19X3.5"

## (undated) DEVICE — GOWN XXL

## (undated) DEVICE — WARMING BLANKET UPPER ADULT

## (undated) DEVICE — FOR-ESU VALLEYLAB T7E14999DX: Type: DURABLE MEDICAL EQUIPMENT

## (undated) DEVICE — SOL IRR BAG NS 0.9% 3000ML

## (undated) DEVICE — ELCTR GROUNDING PAD ADULT COVIDIEN

## (undated) DEVICE — SYR LUER LOK 20CC

## (undated) DEVICE — NDL HYPO SAFE 22G X 1.5" (BLACK)

## (undated) DEVICE — ARTHREX FINGERSHIELD

## (undated) DEVICE — DRAPE SHOWER CURTAIN ISOLATION

## (undated) DEVICE — Device

## (undated) DEVICE — SUT NDL MAYO CATGUT 1/2 CIRCLE TAPER POINT 0.050" X 1.521"

## (undated) DEVICE — DRAPE IOBAN 33" X 23"

## (undated) DEVICE — TAPE SILK 3"

## (undated) DEVICE — SOL IRR POUR H2O 1500ML

## (undated) DEVICE — VENODYNE/SCD SLEEVE CALF MEDIUM